# Patient Record
Sex: FEMALE | Race: WHITE | NOT HISPANIC OR LATINO | Employment: OTHER | URBAN - METROPOLITAN AREA
[De-identification: names, ages, dates, MRNs, and addresses within clinical notes are randomized per-mention and may not be internally consistent; named-entity substitution may affect disease eponyms.]

---

## 2020-09-22 ENCOUNTER — OFFICE VISIT (OUTPATIENT)
Dept: FAMILY MEDICINE CLINIC | Facility: CLINIC | Age: 70
End: 2020-09-22
Payer: MEDICARE

## 2020-09-22 ENCOUNTER — HOSPITAL ENCOUNTER (OUTPATIENT)
Dept: RADIOLOGY | Facility: HOSPITAL | Age: 70
Discharge: HOME | End: 2020-09-22
Payer: MEDICARE

## 2020-09-22 VITALS
SYSTOLIC BLOOD PRESSURE: 118 MMHG | DIASTOLIC BLOOD PRESSURE: 82 MMHG | HEART RATE: 92 BPM | WEIGHT: 186 LBS | RESPIRATION RATE: 16 BRPM | TEMPERATURE: 96.8 F | BODY MASS INDEX: 31.76 KG/M2 | OXYGEN SATURATION: 97 % | HEIGHT: 64 IN

## 2020-09-22 DIAGNOSIS — G56.91 NEUROPATHY, ARM, RIGHT: ICD-10-CM

## 2020-09-22 DIAGNOSIS — Z23 FLU VACCINE NEED: Primary | ICD-10-CM

## 2020-09-22 DIAGNOSIS — E78.2 MIXED HYPERLIPIDEMIA: ICD-10-CM

## 2020-09-22 DIAGNOSIS — E55.9 VITAMIN D DEFICIENCY: ICD-10-CM

## 2020-09-22 DIAGNOSIS — R73.01 ELEVATED FASTING GLUCOSE: ICD-10-CM

## 2020-09-22 DIAGNOSIS — G62.9 NEUROPATHY: ICD-10-CM

## 2020-09-22 PROBLEM — Z87.891 EX-SMOKER: Status: ACTIVE | Noted: 2020-06-25

## 2020-09-22 PROBLEM — R94.31 ABNORMAL EKG: Status: ACTIVE | Noted: 2020-06-25

## 2020-09-22 PROBLEM — I49.3 FREQUENT PVCS: Status: ACTIVE | Noted: 2020-06-25

## 2020-09-22 LAB — SL AMB POCT HEMOGLOBIN AIC: 5.9 (ref ?–6.5)

## 2020-09-22 PROCEDURE — 90662 IIV NO PRSV INCREASED AG IM: CPT

## 2020-09-22 PROCEDURE — 72072 X-RAY EXAM THORAC SPINE 3VWS: CPT

## 2020-09-22 PROCEDURE — 83036 HEMOGLOBIN GLYCOSYLATED A1C: CPT

## 2020-09-22 PROCEDURE — 99213 OFFICE O/P EST LOW 20 MIN: CPT

## 2020-09-22 PROCEDURE — 72050 X-RAY EXAM NECK SPINE 4/5VWS: CPT

## 2020-09-22 PROCEDURE — G0008 ADMIN INFLUENZA VIRUS VAC: HCPCS

## 2020-09-22 PROCEDURE — 72110 X-RAY EXAM L-2 SPINE 4/>VWS: CPT

## 2020-09-22 RX ORDER — TRIAMCINOLONE ACETONIDE 1 MG/G
CREAM TOPICAL
COMMUNITY
Start: 2020-07-13

## 2020-09-22 RX ORDER — PRAVASTATIN SODIUM 10 MG
TABLET ORAL
COMMUNITY
Start: 2020-07-13 | End: 2020-10-27 | Stop reason: SDUPTHER

## 2020-09-22 NOTE — PROGRESS NOTES
BMI Counseling: Body mass index is 31 93 kg/m²  The BMI is above normal  Nutrition recommendations include decreasing portion sizes, encouraging healthy choices of fruits and vegetables, decreasing fast food intake, consuming healthier snacks, limiting drinks that contain sugar, moderation in carbohydrate intake, increasing intake of lean protein, reducing intake of saturated and trans fat and reducing intake of cholesterol  Exercise recommendations include exercising 3-5 times per week  Patient referred to PCP due to patient being overweight  Falls Plan of Care: Medications that increase falls were reviewed  Assessed feet and footwear  Vitamin D supplementation was recommended  Home safety evaluation by OT recommended  Home safety education provided   PT OT orders in place    Assessment/Plan:    Presents in office for follow up and to re-establish care   History of abnormal EKGs and Frequent PVCs - cardiac work up with Dr Izabela Abel has been unremarkable   Needs follow up labs   Has been complaining of neuropathy type of numbness and tingling to both right hand and left lower extremity  I ordered x rays to get done   Will discuss   Recommended follow up with Therapy to get evaluated once x ray reviewed and if no major findings plan will be to follow up with NEUROLOGY for nerve testing   Tested for diabetes - HbA1C 5 9 prediabetic   Discussed diet and adjustment of lifestyle   Will order x rays of the spine and discuss further    Last labs   Component Value Date   CHOL 196 06/19/2020   HDL 44 (L) 06/19/2020   LDLCALC 107 (H) 06/19/2020   TRIG 337 (H) 06/19/2020     Hyperlipidemia - on Pravachol will need repeat labs   GERD uses PRN OTC meds   Refuses GI follow up for colonoscopy   Refuses breast mammogram   Refuses Pap   Will get Flu shot today - has not been getting them in the past      Problem List Items Addressed This Visit        Other    Mixed hyperlipidemia    Relevant Medications    pravastatin (PRAVACHOL) 10 mg tablet    Other Relevant Orders    Comprehensive metabolic panel    TSH, 3rd generation    Lipid panel    UA (URINE) with reflex to Scope    CBC and differential    Vitamin D 25 hydroxy      Other Visit Diagnoses     Flu vaccine need    -  Primary    Relevant Orders    influenza vaccine, high-dose, PF 0 7 mL (FLUZONE HIGH-DOSE) (Completed)    Neuropathy, arm, right        Relevant Orders    XR spine cervical complete 4 or 5 vw non injury    XR spine thoracic 3 vw    Comprehensive metabolic panel    TSH, 3rd generation    Lipid panel    UA (URINE) with reflex to Scope    CBC and differential    Vitamin D 25 hydroxy    Ambulatory referral to Physical Therapy    POCT hemoglobin A1c (Completed)    Neuropathy        Relevant Orders    XR spine lumbar minimum 4 views non injury    Comprehensive metabolic panel    TSH, 3rd generation    Lipid panel    UA (URINE) with reflex to Scope    CBC and differential    Vitamin D 25 hydroxy    Ambulatory referral to Physical Therapy    POCT hemoglobin A1c (Completed)    Vitamin D deficiency        Relevant Orders    Comprehensive metabolic panel    TSH, 3rd generation    Lipid panel    UA (URINE) with reflex to Scope    CBC and differential    Vitamin D 25 hydroxy    Elevated fasting glucose        Relevant Orders    POCT hemoglobin A1c (Completed)            Subjective:      Patient ID: Ramon Olivarez is a 79 y o  female  Presents in office for follow up and to re-establish care   History of abnormal EKGs and Frequent PVCs - cardiac work up with Dr Wright Holstein has been unremarkable   Needs follow up labs   Has been complaining of neuropathy type of numbness and tingling to both right hand and left lower extremity        The following portions of the patient's history were reviewed and updated as appropriate:   She has a past medical history of History of echocardiogram (04/06/2018)  ,  does not have any pertinent problems on file  ,   has a past surgical history that includes Sinus surgery; Bunionectomy; and Foot surgery (Bilateral)  ,  family history includes Heart attack in her father; Heart disease in her father; Valvular heart disease in her father  ,   reports that she has quit smoking  Her smoking use included cigarettes  She has a 5 00 pack-year smoking history  She has never used smokeless tobacco  She reports current alcohol use  No history on file for drug ,  has No Known Allergies     Current Outpatient Medications   Medication Sig Dispense Refill    pravastatin (PRAVACHOL) 10 mg tablet TAKE 1 TABLET BY MOUTH ONCE DAILY MONDAY THRU FRIDAY AND 2 TABS ON SATURDAY AND SUNDAY      TiZANidine (Zanaflex) 4 MG capsule Take 1 capsule (4 mg total) by mouth daily at bedtime as needed for muscle spasms for up to 14 days 14 capsule 0    triamcinolone (KENALOG) 0 1 % cream APPLY CREAM EXTERNALLY TWICE DAILY TO ECZEMA UP TO 2 WEEKS MONTH AS NEEDED       No current facility-administered medications for this visit  Review of Systems   Constitutional: Positive for fatigue  Negative for chills and fever  HENT: Negative for congestion, sinus pressure and sinus pain  Eyes: Negative  Respiratory: Negative for cough and shortness of breath  Cardiovascular: Negative for chest pain, palpitations and leg swelling  History of abnormal EKG and PVCs under care of Dr Castaneda Dense   cardiac work up done without any major findings    Gastrointestinal: Negative for abdominal distention and abdominal pain  Endocrine:        Neuropathy    Genitourinary: Negative for difficulty urinating and flank pain  Musculoskeletal: Positive for arthralgias and myalgias  Numbness and tingling to right hand and arm   And Left lower extremities    Neurological: Negative for dizziness, weakness and headaches  Hematological: Negative for adenopathy  Psychiatric/Behavioral: Negative for sleep disturbance and suicidal ideas  The patient is nervous/anxious            Objective:  Vitals:    09/22/20 1355 BP: 118/82   BP Location: Left arm   Patient Position: Sitting   Cuff Size: Standard   Pulse: 92   Resp: 16   Temp: (!) 96 8 °F (36 °C)   TempSrc: Tympanic   SpO2: 97%   Weight: 84 4 kg (186 lb)   Height: 5' 4" (1 626 m)     Body mass index is 31 93 kg/m²  Physical Exam  Vitals signs and nursing note reviewed  Constitutional:       Appearance: Normal appearance  HENT:      Head: Atraumatic  Eyes:      Extraocular Movements: Extraocular movements intact  Neck:      Musculoskeletal: Normal range of motion and neck supple  Cardiovascular:      Rate and Rhythm: Normal rate and regular rhythm  Pulses: Normal pulses  Heart sounds: Normal heart sounds  Comments: With history of PVCs   Pulmonary:      Effort: Pulmonary effort is normal       Breath sounds: Normal breath sounds  Abdominal:      Palpations: Abdomen is soft  Musculoskeletal: Normal range of motion  Comments: Numbness and tingling noted to right arm and left lower extremity    Skin:     General: Skin is warm  Neurological:      General: No focal deficit present  Mental Status: She is alert and oriented to person, place, and time     Psychiatric:         Behavior: Behavior normal

## 2020-09-22 NOTE — PATIENT INSTRUCTIONS
Peripheral Neuropathy   WHAT YOU NEED TO KNOW:   Peripheral neuropathy is a condition that affects how your nerves work  Nerves carry information from your brain to your body  The information does not transfer along your nerves correctly when you have neuropathy  When you have peripheral neuropathy, the nerves in your legs, arms, feet, or hands are affected  It also may affect your organs, such as your lungs, stomach, bladder, or genitals  This condition may go away on its own or you may always have it  DISCHARGE INSTRUCTIONS:   Medicines:   · Pain medicine: You may be given medicine to take away or decrease pain  Do not wait until the pain is severe before you take your medicine  · Antidepressants: This medicine helps to decrease or stop the symptoms of depression  It also used to help decrease pain  Take this medicine as directed  · Antiseizure medicine: This medicine is usually given to control seizures, but it also helps with nerve pain  · Take your medicine as directed  Contact your healthcare provider if you think your medicine is not helping or if you have side effects  Tell him of her if you are allergic to any medicine  Keep a list of the medicines, vitamins, and herbs you take  Include the amounts, and when and why you take them  Bring the list or the pill bottles to follow-up visits  Carry your medicine list with you in case of an emergency  Follow up with your healthcare provider or neurologist as directed:  Write down your questions so you remember to ask them during your visits  Physical therapy:  Physical and occupational therapists may help you exercise your arms, legs, and hands  They may teach you new ways to do things at home  Brace or splint:  You may need a device that supports or holds a body part still  For example, if you have carpal tunnel syndrome, you may need to wear a wrist brace  Manage your peripheral neuropathy:   · Avoid falls: Move with care and stand up slowly  Wear shoes that support your feet, and do not go barefoot  Ask about walking aids, such as a cane or walker  You may want to install railings or nonslip pads in your home, especially in the bathroom  Ask for more information on how to avoid falls  · Check your skin daily:  Sores can form where your skin makes contact with chairs, beds, or other body parts  They also can form under splints  Keep your skin clean, and check your skin daily for sores  · Exercise:  Ask about the best exercise plan for you  Physical activity may increase your balance and strength and may decrease your pain  It is best to start exercising slowly and do more as you get stronger  Contact your healthcare provider or neurologist if:   · Your pain is severe  · You cannot control your bladder  · You have trouble having sex  · You have questions or concerns about your condition or care  Return to the emergency department if:   · You fall  · You cannot walk at all  © 2017 [employer]  Information is for End User's use only and may not be sold, redistributed or otherwise used for commercial purposes  All illustrations and images included in CareNotes® are the copyrighted property of [employer] , [employer]  or [employer]anie[employer]  The above information is an  only  It is not intended as medical advice for individual conditions or treatments  Talk to your doctor, nurse or pharmacist before following any medical regimen to see if it is safe and effective for you

## 2020-09-25 ENCOUNTER — TELEPHONE (OUTPATIENT)
Dept: FAMILY MEDICINE CLINIC | Facility: CLINIC | Age: 70
End: 2020-09-25

## 2020-09-25 DIAGNOSIS — G62.9 NEUROPATHY: Primary | ICD-10-CM

## 2020-09-25 RX ORDER — TIZANIDINE HYDROCHLORIDE 4 MG/1
4 CAPSULE, GELATIN COATED ORAL
Qty: 14 CAPSULE | Refills: 0 | Status: SHIPPED | OUTPATIENT
Start: 2020-09-25 | End: 2021-07-20 | Stop reason: CLARIF

## 2020-10-27 ENCOUNTER — TELEPHONE (OUTPATIENT)
Dept: FAMILY MEDICINE CLINIC | Facility: CLINIC | Age: 70
End: 2020-10-27

## 2020-10-27 DIAGNOSIS — E78.2 MIXED HYPERLIPIDEMIA: Primary | ICD-10-CM

## 2020-10-27 RX ORDER — PRAVASTATIN SODIUM 10 MG
TABLET ORAL
Qty: 120 TABLET | Refills: 1 | Status: SHIPPED | OUTPATIENT
Start: 2020-10-27 | End: 2021-05-03 | Stop reason: SDUPTHER

## 2021-01-25 ENCOUNTER — TELEPHONE (OUTPATIENT)
Dept: NEUROLOGY | Facility: CLINIC | Age: 71
End: 2021-01-25

## 2021-01-26 ENCOUNTER — TELEPHONE (OUTPATIENT)
Dept: NEUROLOGY | Facility: CLINIC | Age: 71
End: 2021-01-26

## 2021-01-26 ENCOUNTER — CONSULT (OUTPATIENT)
Dept: NEUROLOGY | Facility: CLINIC | Age: 71
End: 2021-01-26
Payer: MEDICARE

## 2021-01-26 VITALS
BODY MASS INDEX: 31.45 KG/M2 | SYSTOLIC BLOOD PRESSURE: 134 MMHG | DIASTOLIC BLOOD PRESSURE: 80 MMHG | WEIGHT: 183.2 LBS | HEART RATE: 104 BPM

## 2021-01-26 DIAGNOSIS — R20.3 HYPERESTHESIA: ICD-10-CM

## 2021-01-26 DIAGNOSIS — G56.91 NEUROPATHY, ARM, RIGHT: ICD-10-CM

## 2021-01-26 DIAGNOSIS — R73.09 OTHER ABNORMAL GLUCOSE: ICD-10-CM

## 2021-01-26 DIAGNOSIS — R20.2 PARESTHESIA: ICD-10-CM

## 2021-01-26 DIAGNOSIS — G62.9 NEUROPATHY: Primary | ICD-10-CM

## 2021-01-26 PROCEDURE — 99204 OFFICE O/P NEW MOD 45 MIN: CPT | Performed by: PSYCHIATRY & NEUROLOGY

## 2021-01-26 NOTE — ASSESSMENT & PLAN NOTE
Hyperesthesia of the LLE present since September 2020  Initially started over a couple of days as a tingling of the LLE and progressively became a burning hypersensitivity from the mid abdomen down  Denies trauma, medical changes, or other notable issues prior to onset  She denies bowel or bladder changes  Exam is positive for sensory changes of the LLE to T6-T7  No previous labs or MRIs    X-rays whole spine positive for degenerative changes    Impression: rapid onset unilateral hyperesthesia of the LLE concerning for spinal cord involvement       Plan:  - MRI C/T/L spine + brain w/wo contrast recommended although pt requires sedation and will not attend 2 different sedation events, as such will prioritize C/T spine at this time  - neuropathy labs ordered  - follow up in 3 months

## 2021-01-26 NOTE — ASSESSMENT & PLAN NOTE
Chaka Gomez is a 79 y o  female who presents for complaint of numbness of the distal right upper extremity of the first 3 digits  This has been present for 10 years, has not changed in character or location since onset  Consistent with likely carpal tunnel syndrome  Was never previously assessed and did not have imaging or EMG previously      Plan:  - see plan under hyperesthesia

## 2021-01-26 NOTE — TELEPHONE ENCOUNTER
Patient denied having AVS printed  She was upset that she could not do all 4 MRI's under Anesthesia at once  We tried to explain to her that that is not possible  We offered first focus only on the cervical and thoracic spine and have those scheduled under Anesthesia  Patient got upset again and only wanted to go to [facility] to have MRI's done and refused to got to [facility] or [place]  She walked out on me  I mailed her AVS and scripts to her

## 2021-01-26 NOTE — PROGRESS NOTES
Patient ID: Suhail Muse is a 79 y o  female  Assessment/Plan:    Neuropathy  Suhail Muse is a 79 y o  female who presents for complaint of numbness of the distal right upper extremity of the first 3 digits  This has been present for 10 years, has not changed in character or location since onset  Consistent with likely carpal tunnel syndrome  Was never previously assessed and did not have imaging or EMG previously  Plan:  - see plan under hyperesthesia    Hyperesthesia  Hyperesthesia of the LLE present since September 2020  Initially started over a couple of days as a tingling of the LLE and progressively became a burning hypersensitivity from the mid abdomen down  Denies trauma, medical changes, or other notable issues prior to onset  She denies bowel or bladder changes  Exam is positive for sensory changes of the LLE to T6-T7  No previous labs or MRIs    X-rays whole spine positive for degenerative changes    Impression: rapid onset unilateral hyperesthesia of the LLE concerning for spinal cord involvement  Plan:  - MRI C/T/L spine + brain w/wo contrast recommended although pt requires sedation and will not attend 2 different sedation events, as such will prioritize C/T spine at this time  - neuropathy labs ordered  - follow up in 3 months       Diagnoses and all orders for this visit:    Neuropathy  -     Ambulatory referral to Neurology  -     MRI brain with and without contrast; Future  -     MRI lumbar spine w wo contrast; Future  -     MRI thoracic spine with and without contrast; Future  -     MRI cervical spine with and without contrast; Future  -     Vitamin B12; Future  -     Protein electrophoresis, serum; Future  -     Protein electrophoresis, urine  -     RPR; Future  -     Hemoglobin A1C; Future  -     TSH, 3rd generation with Free T4 reflex; Future  -     LISA Screen w/ Reflex to Titer/Pattern; Future  -     Sedimentation rate, automated;  Future  -     Lyme Total Antibody Profile with reflex to WB; Future    Neuropathy, arm, right  -     Ambulatory referral to Neurology    Paresthesia  -     MRI brain with and without contrast; Future  -     MRI lumbar spine w wo contrast; Future  -     MRI thoracic spine with and without contrast; Future  -     MRI cervical spine with and without contrast; Future  -     Vitamin B12; Future  -     Protein electrophoresis, serum; Future  -     Protein electrophoresis, urine  -     RPR; Future  -     Hemoglobin A1C; Future  -     TSH, 3rd generation with Free T4 reflex; Future  -     LISA Screen w/ Reflex to Titer/Pattern; Future  -     Sedimentation rate, automated; Future  -     Lyme Total Antibody Profile with reflex to WB; Future    Hyperesthesia  -     MRI brain with and without contrast; Future  -     MRI lumbar spine w wo contrast; Future  -     MRI thoracic spine with and without contrast; Future  -     MRI cervical spine with and without contrast; Future  -     Vitamin B12; Future  -     Protein electrophoresis, serum; Future  -     Protein electrophoresis, urine  -     RPR; Future  -     Hemoglobin A1C; Future  -     TSH, 3rd generation with Free T4 reflex; Future  -     LISA Screen w/ Reflex to Titer/Pattern; Future  -     Sedimentation rate, automated; Future  -     Lyme Total Antibody Profile with reflex to WB; Future    Other abnormal glucose   -     Hemoglobin A1C; Future           Subjective:    HPI    Erie Galeazzi is a 79 y o  female who presents to clinic as a new assessment for neuropathy and complaint of burning pain in her LLE which started recently  10 years ago, she started having numbness and tingling first 3 digits of the right hand  Never got any worse or better and she couldn't afford to see anyone about it, so she let it go  In September of 2020 she started experiencing tingling of the left leg/foot, she obtained xray of the full spine which was negative  She was offered PT and muscle relaxer and did not follow up with either   She reports the feeling in her left leg from the mid torso with a midline and the feeling is reported as a burning feeling to the touch  The issue started as a tingling feeling in September and has become more hypersensitive to touch since then with no apparent progression since onset  Her medical history is significant for HLD (on pravastatin, has high TG) and GERD, otherwise unremarkable  She describes symptoms of numbness, burning, tingling, hypersensitivity and allodynia affecting her extremities which is left lower extremity and distal right upper extremity, of moderate severity  She denies squeezing  Onset of symptoms was sudden, not related to any specific activity  Symptoms currently occur all day and last all day  She denies lancinating pain  Previous treatment has included ibuprofen, which has not improved symptoms  Prior evaluation has included none  The following portions of the patient's history were reviewed and updated as appropriate: allergies, current medications, past family history, past medical history, past social history, past surgical history and problem list          Objective:    Blood pressure 134/80, pulse 104, weight 83 1 kg (183 lb 3 2 oz)  Physical Exam  Vitals signs and nursing note reviewed  Constitutional:       General: She is not in acute distress  Appearance: She is well-developed  Eyes:      Extraocular Movements: Extraocular movements intact  Pupils: Pupils are equal, round, and reactive to light  Neck:      Musculoskeletal: Normal range of motion and neck supple  Cardiovascular:      Rate and Rhythm: Normal rate and regular rhythm  Heart sounds: Normal heart sounds  Pulmonary:      Effort: Pulmonary effort is normal       Breath sounds: Normal breath sounds  Musculoskeletal: Normal range of motion     Neurological:      Deep Tendon Reflexes: Strength normal       Reflex Scores:       Tricep reflexes are 3+ on the right side and 3+ on the left side  Bicep reflexes are 2+ on the right side and 2+ on the left side  Brachioradialis reflexes are 3+ on the right side and 3+ on the left side  Patellar reflexes are 3+ on the right side and 3+ on the left side  Achilles reflexes are 3+ on the right side and 3+ on the left side  Psychiatric:         Speech: Speech normal          Neurological Exam  Mental Status  Awake, alert and oriented to person, place and time  Recent and remote memory are intact  Speech is normal  Language is fluent with no aphasia  Attention and concentration are normal  Fund of knowledge is appropriate for level of education  Cranial Nerves  CN III, IV, VI: Extraocular movements intact bilaterally  Pupils equal round and reactive to light bilaterally  CN V: Facial sensation is normal   CN VII: Full and symmetric facial movement  CN VIII: Hearing is normal   CN IX, X: Palate elevates symmetrically  Normal gag reflex  CN XI: Shoulder shrug strength is normal   CN XII: Tongue midline without atrophy or fasciculations  Motor  Normal muscle bulk throughout  Normal muscle tone  Strength is 5/5 throughout all four extremities  Sensory  Light touch abnormality: Decreased to light touch in the right hand first 3 digits  Decreased to light touch in the LLE throughout  Pinprick abnormality: Decreased in LLE throughout  Decreased from distal midfoot of the RLE  Hyperesthesia of the left side of the back at about T6-T7 and distal  Temperature abnormality: Hyperesthesia of the LLE throughout to T6-T7  Diminished to temperature to ankle of the RLE  Decreased to temperature below the knee of the LLE  Vibration abnormality: Decreased of RUE first 3 digits       Reflexes                                           Right                      Left  Brachioradialis                    3+                         3+  Biceps                                 2+                         2+  Triceps 3+                         3+  Patellar                                3+                         3+  Achilles                                3+                         3+  Plantar                           Mute                Mute    Right pathological reflexes: Maureen's present  Left pathological reflexes: Maureen's absent  Coordination  Right: Finger-to-nose normal  Rapid alternating movement normal   Left: Finger-to-nose normal  Rapid alternating movement normal     Gait  Casual gait is normal including stance, stride, and arm swing  ROS:    Review of systems as documented by the MA was reviewed in full by myself, Zia Vásquez MD        Review of Systems   Constitutional: Negative  Negative for appetite change and fever  HENT: Negative  Negative for hearing loss, tinnitus, trouble swallowing and voice change  Eyes: Negative  Negative for photophobia and pain  Respiratory: Negative  Negative for shortness of breath  Cardiovascular: Negative  Negative for palpitations  Gastrointestinal: Negative  Negative for nausea and vomiting  Endocrine: Negative  Negative for cold intolerance  Genitourinary: Negative  Negative for dysuria, frequency and urgency  Musculoskeletal: Negative  Negative for myalgias and neck pain  Skin: Negative  Negative for rash  Warm feeling of the skin, but when touching it has a burning sensation of the skin   Neurological: Positive for numbness (L leg)  Negative for dizziness, tremors, seizures, syncope, facial asymmetry, speech difficulty, weakness, light-headedness and headaches  Tingling - R hand - fingers   Hematological: Negative  Does not bruise/bleed easily  Psychiatric/Behavioral: Negative  Negative for confusion, hallucinations and sleep disturbance

## 2021-02-13 DIAGNOSIS — Z23 ENCOUNTER FOR IMMUNIZATION: ICD-10-CM

## 2021-02-17 ENCOUNTER — IMMUNIZATIONS (OUTPATIENT)
Dept: FAMILY MEDICINE CLINIC | Facility: HOSPITAL | Age: 71
End: 2021-02-17

## 2021-02-17 DIAGNOSIS — Z23 ENCOUNTER FOR IMMUNIZATION: Primary | ICD-10-CM

## 2021-02-17 PROCEDURE — 0001A SARS-COV-2 / COVID-19 MRNA VACCINE (PFIZER-BIONTECH) 30 MCG: CPT

## 2021-02-17 PROCEDURE — 91300 SARS-COV-2 / COVID-19 MRNA VACCINE (PFIZER-BIONTECH) 30 MCG: CPT

## 2021-03-08 ENCOUNTER — IMMUNIZATIONS (OUTPATIENT)
Dept: FAMILY MEDICINE CLINIC | Facility: HOSPITAL | Age: 71
End: 2021-03-08

## 2021-03-08 DIAGNOSIS — Z23 ENCOUNTER FOR IMMUNIZATION: Primary | ICD-10-CM

## 2021-03-08 PROCEDURE — 0002A SARS-COV-2 / COVID-19 MRNA VACCINE (PFIZER-BIONTECH) 30 MCG: CPT

## 2021-03-08 PROCEDURE — 91300 SARS-COV-2 / COVID-19 MRNA VACCINE (PFIZER-BIONTECH) 30 MCG: CPT

## 2021-05-03 DIAGNOSIS — E78.2 MIXED HYPERLIPIDEMIA: ICD-10-CM

## 2021-05-03 RX ORDER — PRAVASTATIN SODIUM 10 MG
TABLET ORAL
Qty: 120 TABLET | Refills: 1 | Status: SHIPPED | OUTPATIENT
Start: 2021-05-03 | End: 2021-08-06

## 2021-07-12 ENCOUNTER — RA CDI HCC (OUTPATIENT)
Dept: OTHER | Facility: HOSPITAL | Age: 71
End: 2021-07-12

## 2021-07-12 NOTE — PROGRESS NOTES
[facility]          Chart reviewed, no opportunity found: CHART REVIEWED, NO OPPORTUNITY FOUND                     Patients insurance company:  [email or web address] [facility] (Medicare Advantage and Commercial)

## 2021-07-20 ENCOUNTER — OFFICE VISIT (OUTPATIENT)
Dept: FAMILY MEDICINE CLINIC | Facility: CLINIC | Age: 71
End: 2021-07-20
Payer: MEDICARE

## 2021-07-20 VITALS
OXYGEN SATURATION: 98 % | SYSTOLIC BLOOD PRESSURE: 122 MMHG | HEIGHT: 64 IN | HEART RATE: 75 BPM | DIASTOLIC BLOOD PRESSURE: 72 MMHG | RESPIRATION RATE: 18 BRPM | BODY MASS INDEX: 30.56 KG/M2 | TEMPERATURE: 98.2 F | WEIGHT: 179 LBS

## 2021-07-20 DIAGNOSIS — Z11.59 NEED FOR HEPATITIS C SCREENING TEST: ICD-10-CM

## 2021-07-20 DIAGNOSIS — G62.9 NEUROPATHY: ICD-10-CM

## 2021-07-20 DIAGNOSIS — E55.9 VITAMIN D INSUFFICIENCY: ICD-10-CM

## 2021-07-20 DIAGNOSIS — R73.9 ELEVATED BLOOD SUGAR: ICD-10-CM

## 2021-07-20 DIAGNOSIS — E78.2 MIXED HYPERLIPIDEMIA: Primary | ICD-10-CM

## 2021-07-20 DIAGNOSIS — G60.3 IDIOPATHIC PROGRESSIVE NEUROPATHY: ICD-10-CM

## 2021-07-20 DIAGNOSIS — R73.03 PREDIABETES: ICD-10-CM

## 2021-07-20 PROCEDURE — 99215 OFFICE O/P EST HI 40 MIN: CPT | Performed by: FAMILY MEDICINE

## 2021-07-20 NOTE — PROGRESS NOTES
BMI Counseling: Body mass index is 30 73 kg/m²  The BMI is above normal  Nutrition recommendations include decreasing portion sizes, encouraging healthy choices of fruits and vegetables, decreasing fast food intake, consuming healthier snacks, limiting drinks that contain sugar, moderation in carbohydrate intake, increasing intake of lean protein, reducing intake of saturated and trans fat and reducing intake of cholesterol  Exercise recommendations include moderate physical activity 150 minutes/week and exercising 3-5 times per week  No pharmacotherapy was ordered  Assessment/Plan:  80 yo female, not seen by me in 10+ yrs  Since that time -- she doesn't go to doctors much -- had both cataracts off (Dr Pascale Short and then [facility]) but pre-op at [facility] showed heart beat irreg and saw Dr Edna Hernandez and all tests, incl stress test and ECHO - all normal   All this done since 2018  Having no problems currently  F/u and eval HLD:  Dr Edna Hernandez put on Pravastatin 10 mg 2 OD    F/u and eval GERD: on Omeprazol 20 (?) q a m  Long talk re PPI and H2 blocker  I have advised to get off the omeprazole and take Pepcid 40 mg once daily and have given her literature regarding that    Smoking cessation -- costs $2100 the last visit with the labs==>but did end the smoking completely  This was 11 yrs ago  Anxiety - over claustrophia-- refusing the MRI 's of Dr Alka Guerrero, as no changes  Very long talk discussing all of the studies both laboratory x-ray and MRIs ordered by Dr Alka Guerrero to determine whether there is any pathology in the cervical and thoracic spine  Patient is concerned about claustrophobia and does not want to have these tests done as her symptoms have for the most part abated  They may be there but have not worsened in the past 8 or 9 months    Basically still has some sort of radiculopathy in the right upper extremity possibly carpal tunnel and a hyperesthesia of the left lower extremity with alteration of cold and heat sensation  Neurologist seem to feel this was coming from the T6-7 vertebral range and wanted MRIs of the entire spine and brain--looking for even symptoms of MS question    PPI usage x many yrs - and long discussions there  Needs to get labs  Review all X-rays of 9 22 and d/w           Problem List Items Addressed This Visit        Nervous and Auditory    Neuropathy    Relevant Orders    CBC    UA w Reflex to Microscopic w Reflex to Culture    Comprehensive metabolic panel    Lipid panel    TSH, 3rd generation    Vitamin D 25 hydroxy    Protein electrophoresis, serum    Protein electrophoresis, urine    Vitamin B12    C-reactive protein       Other    Mixed hyperlipidemia - Primary    Relevant Orders    Comprehensive metabolic panel    Lipid panel      Other Visit Diagnoses     Vitamin D insufficiency        Relevant Orders    Vitamin D 25 hydroxy    Elevated blood sugar        Relevant Orders    Hemoglobin A1C    Prediabetes         Relevant Orders    TSH, 3rd generation    Idiopathic progressive neuropathy         Relevant Orders    Vitamin B12    Need for hepatitis C screening test        Relevant Orders    Hepatitis C antibody            Subjective:  75-year-old white female in no acute distress, pleasant and anxious to have me review her entire situation as it presents  Discussed all the test ordered which have not been done yet and have expressed my feelings that they should be  Patient ID: Amy Armstrong is a 79 y o  female  HPI-- 78 yo female, not seen by me in 10+ yrs  Since that time -- she doesn't go to doctors much -- had both cataracts off (Dr Melina Mohs and then [facility]) but pre[facility]op at [facility] showed hert beat irreg and saw Dr Danielle Murray and all tests, incl stress test and ECHO - all normal   All this done since 2018  F/u and eval HLD:  Dr Danielle Murray put on Pravastatin 10 mg 2 OD    F/u and eval GERD: on Omeprazol 20 (?) q a m    Long talk re PPI and H2 blocker  Smoking cessation -- costs $2100 the last visit with the labs==>but did end the smoking completely  This was 11 yrs ago  Anxiety - over claustrophia-- refusing the MRI 's of Dr Jem Escobedo, as no changes    PPI usage x many yrs - and long discussions there  Needs to get labs  Review all X-rays of 9 22 and d/w    The following portions of the patient's history were reviewed and updated as appropriate:   Past Medical History:  She has a past medical history of History of echocardiogram (04/06/2018)  ,  _______________________________________________________________________  Medical Problems:  does not have any pertinent problems on file ,  _______________________________________________________________________  Past Surgical History:   has a past surgical history that includes Sinus surgery; Bunionectomy; and Foot surgery (Bilateral)  ,  _______________________________________________________________________  Family History:  family history includes Heart attack in her father; Heart disease in her father; Valvular heart disease in her father ,  _______________________________________________________________________  Social History:   reports that she has quit smoking  Her smoking use included cigarettes  She has a 5 00 pack-year smoking history  She has never used smokeless tobacco  She reports current alcohol use  No history on file for drug use ,  _______________________________________________________________________  Allergies:  has No Known Allergies     _______________________________________________________________________  Current Outpatient Medications   Medication Sig Dispense Refill    pravastatin (PRAVACHOL) 10 mg tablet Take one tablet daily Monday through Friday 2 tablets saturday and Sunday 120 tablet 1    triamcinolone (KENALOG) 0 1 % cream APPLY CREAM EXTERNALLY TWICE DAILY TO ECZEMA UP TO 2 WEEKS MONTH AS NEEDED       No current facility-administered medications for this visit  _______________________________________________________________________  Review of Systems   Constitutional: Positive for fatigue  Negative for chills and fever  HENT: Negative for congestion, sinus pressure and sinus pain  Eyes: Negative  Respiratory: Negative for cough and shortness of breath  Cardiovascular: Negative for chest pain, palpitations and leg swelling  History of abnormal EKG and PVCs under care of Dr Jihan Lynn   cardiac work up done without any major findings    Gastrointestinal: Negative for abdominal distention and abdominal pain  Endocrine:        Neuropathy    Genitourinary: Negative for difficulty urinating and flank pain  Musculoskeletal: Positive for arthralgias and myalgias  Numbness and tingling to right hand and arm   And Left lower extremities    Neurological: Negative for dizziness, weakness and headaches  Hematological: Negative for adenopathy  Psychiatric/Behavioral: Negative for sleep disturbance and suicidal ideas  The patient is nervous/anxious  Objective:  Vitals:    07/20/21 1504   BP: 122/72   Pulse: 75   Resp: 18   Temp: 98 2 °F (36 8 °C)   SpO2: 98%   Weight: 81 2 kg (179 lb)   Height: 5' 4" (1 626 m)     Body mass index is 30 73 kg/m²  Physical Exam  Vitals and nursing note reviewed  Constitutional:       Appearance: Normal appearance  HENT:      Head: Atraumatic  Eyes:      Extraocular Movements: Extraocular movements intact  Cardiovascular:      Rate and Rhythm: Normal rate and regular rhythm  Pulses: Normal pulses  Heart sounds: Normal heart sounds  Comments: With history of PVCs   Pulmonary:      Effort: Pulmonary effort is normal       Breath sounds: Normal breath sounds  Abdominal:      Palpations: Abdomen is soft  Musculoskeletal:         General: Normal range of motion  Cervical back: Normal range of motion and neck supple        Comments: Numbness and tingling noted to right arm and left lower extremity    Skin:     General: Skin is warm  Neurological:      General: No focal deficit present  Mental Status: She is alert and oriented to person, place, and time  Psychiatric:         Mood and Affect: Mood normal          Behavior: Behavior normal          Thought Content: Thought content normal          Judgment: Judgment normal          I urged mammo--she stated she would never have done  At that point I helped her to understand that the incidence of breast cancer at age 48 is 3 in 48 women and at age 80 in 8  I strongly urged her to proceed with cancer screenings ,etc   50-55 minutes spent with patient This time was spent reviewing previous records, reviewing previous laboratory and other tests, taking history from patient, examination of patient, discussion of prognosis and treatment, ordering laboratory tests, ordering medications, and completion of the medical record

## 2021-07-20 NOTE — PATIENT INSTRUCTIONS
nPeripheral Neuropathy   WHAT YOU NEED TO KNOW:   Peripheral neuropathy is a condition that affects nerves in your arms, legs, hands, or feet  It also may affect your organs, such as your lungs, stomach, bladder, or genitals  Peripheral neuropathy can affect the nerves that allow you to move or to feel objects  It also may affect nerves that control your body functions, such as digestion or urination  This condition may go away on its own, or you may always have it  DISCHARGE INSTRUCTIONS:   Call your local emergency number (911 in the [address] [place] [address],[address]) if:   · You cannot walk at all  Return to the emergency department if:   · You fall  Call your doctor or neurologist if:   · Your pain is severe  · You cannot control your bladder  · You have trouble having sex  · You have questions or concerns about your condition or care  Medicines: You may need any of the following:  · Prescription pain medicine  may be given  Ask your healthcare provider how to take this medicine safely  Some prescription pain medicines contain acetaminophen  Do not take other medicines that contain acetaminophen without talking to your healthcare provider  Too much acetaminophen may cause liver damage  Prescription pain medicine may cause constipation  Ask your healthcare provider how to prevent or treat constipation  · Antidepressants  also help decrease pain  · Antiseizure medicine  also helps with nerve pain  · Take your medicine as directed  Contact your healthcare provider if you think your medicine is not helping or if you have side effects  Tell him of her if you are allergic to any medicine  Keep a list of the medicines, vitamins, and herbs you take  Include the amounts, and when and why you take them  Bring the list or the pill bottles to follow-up visits  Carry your medicine list with you in case of an emergency  Manage peripheral neuropathy:   · Go to physical or occupational therapy as directed    Physical and occupational therapists may help you exercise your arms, legs, and hands  They may teach you new ways to do things at home  · Wear a brace or splint, if directed  You may need a device that supports or holds a body part still  For example, if you have carpal tunnel syndrome, you may need to wear a wrist brace  · Prevent falls  Move with care and stand up slowly  Wear shoes that support your feet, and do not go barefoot  Ask about walking aids, such as a cane or walker  You may want to install railings or nonslip pads in your home, especially in the bathroom  Ask for more information on how to avoid falls  · Check your skin daily  Sores can form where your skin makes contact with chairs, beds, or other body parts  They also can form under splints  Keep your skin clean, and check your skin daily for sores  · Exercise as directed  Ask about the best exercise plan for you  Physical activity may increase your balance and strength and may decrease your pain  It is best to start exercising slowly and do more as you get stronger  Follow up with your doctor or neurologist as directed:  Write down your questions so you remember to ask them during your visits  © Copyright [employer] 2021 Information is for End User's use only and may not be sold, redistributed or otherwise used for commercial purposes  All illustrations and images included in CareNotes® are the copyrighted property of [employer]  or [facility] Desiree Kenny   The above information is an  only  It is not intended as medical advice for individual conditions or treatments  Talk to your doctor, nurse or pharmacist before following any medical regimen to see if it is safe and effective for you  Carpal Tunnel Syndrome   WHAT YOU SHOULD KNOW:   Carpal tunnel syndrome (CTS) is a condition where there is increased pressure on the median nerve in the wrist  The median nerve controls muscles and feeling in the hand   Pressure may come from overuse and swelling of ligaments in the wrist    INSTRUCTIONS:   Medicines:   · NSAIDs:  These medicines decrease swelling and pain  NSAIDs are available without a doctor's order  Ask your primary healthcare provider which medicine is right for you and how much to take  Take as directed  NSAIDs can cause stomach bleeding or kidney problems if not taken correctly  · Take your medicine as directed  Call your healthcare provider if you think your medicine is not helping or if you have side effects  Tell him if you are allergic to any medicine  Keep a list of the medicines, vitamins, and herbs you take  Include the amounts, and when and why you take them  Bring the list or the pill bottles to follow-up visits  Carry your medicine list with you in case of an emergency  Follow up with your healthcare provider as directed:  Write down your questions so you remember to ask them during your visits  Manage your symptoms:   · Use ice:  Ice helps decrease swelling and pain in your wrist  Ice may also help prevent tissue damage  Use an ice pack or put crushed ice in a plastic bag  Cover the ice pack with a towel and place it on your wrist for 15 to 20 minutes every hour  · Get physical and occupational therapy:  Physical therapists will show you ways to exercise and strengthen your wrist  Occupational therapists will show you safe ways to use your wrist while you do your usual activities  · Rest your hands:  Let your hands rest for a short time between repetitive motions, such as typing  If you feel pain, stop what you are doing and gently massage your wrist and hand  · Use a wrist splint: This keeps your wrist straight or in a slightly bent position  A wrist splint decreases pressure on the median nerve by letting your wrist rest  You may need to wear the splint for up to 8 weeks  You may need to wear your wrist splint at night      · Evaluate your work habits:  Ask about ways to modify your work to help decrease your symptoms  Contact your primary healthcare provider if:   · Your symptoms are worse than before  · You have questions or concerns about your condition or care  Return to the emergency department if:   · You suddenly lose feeling and cannot move your hand  · Your hand suddenly changes color  © 2014 [address] is for End User's use only and may not be sold, redistributed or otherwise used for commercial purposes  All illustrations and images included in CareNotes® are the copyrighted property of A D A M , Inc  or Seferino Samaniego  The above information is an  only  It is not intended as medical advice for individual conditions or treatments  Talk to your doctor, nurse or pharmacist before following any medical regimen to see if it is safe and effective for you  Peripheral Neuropathy   WHAT YOU NEED TO KNOW:   Peripheral neuropathy is a condition that affects nerves in your arms, legs, hands, or feet  It also may affect your organs, such as your lungs, stomach, bladder, or genitals  Peripheral neuropathy can affect the nerves that allow you to move or to feel objects  It also may affect nerves that control your body functions, such as digestion or urination  This condition may go away on its own, or you may always have it  DISCHARGE INSTRUCTIONS:   Call your local emergency number (911 in the [address] [facility],[address]) if:   · You cannot walk at all  Return to the emergency department if:   · You fall  Call your doctor or neurologist if:   · Your pain is severe  · You cannot control your bladder  · You have trouble having sex  · You have questions or concerns about your condition or care  Medicines: You may need any of the following:  · Prescription pain medicine  may be given  Ask your healthcare provider how to take this medicine safely  Some prescription pain medicines contain acetaminophen   Do not take other medicines that contain acetaminophen without talking to your healthcare provider  Too much acetaminophen may cause liver damage  Prescription pain medicine may cause constipation  Ask your healthcare provider how to prevent or treat constipation  · Antidepressants  also help decrease pain  · Antiseizure medicine  also helps with nerve pain  · Take your medicine as directed  Contact your healthcare provider if you think your medicine is not helping or if you have side effects  Tell him of her if you are allergic to any medicine  Keep a list of the medicines, vitamins, and herbs you take  Include the amounts, and when and why you take them  Bring the list or the pill bottles to follow-up visits  Carry your medicine list with you in case of an emergency  Manage peripheral neuropathy:   · Go to physical or occupational therapy as directed  Physical and occupational therapists may help you exercise your arms, legs, and hands  They may teach you new ways to do things at home  · Wear a brace or splint, if directed  You may need a device that supports or holds a body part still  For example, if you have carpal tunnel syndrome, you may need to wear a wrist brace  · Prevent falls  Move with care and stand up slowly  Wear shoes that support your feet, and do not go barefoot  Ask about walking aids, such as a cane or walker  You may want to install railings or nonslip pads in your home, especially in the bathroom  Ask for more information on how to avoid falls  · Check your skin daily  Sores can form where your skin makes contact with chairs, beds, or other body parts  They also can form under splints  Keep your skin clean, and check your skin daily for sores  · Exercise as directed  Ask about the best exercise plan for you  Physical activity may increase your balance and strength and may decrease your pain  It is best to start exercising slowly and do more as you get stronger         Follow up with your doctor or neurologist as directed:  Write down your questions so you remember to ask them during your visits  © Copyright [employer] 2021 Information is for End User's use only and may not be sold, redistributed or otherwise used for commercial purposes  All illustrations and images included in CareNotes® are the copyrighted property of A D A M , Inc  or Orlando Kenny   The above information is an  only  It is not intended as medical advice for individual conditions or treatments  Talk to your doctor, nurse or pharmacist before following any medical regimen to see if it is safe and effective for you

## 2021-07-29 ENCOUNTER — APPOINTMENT (OUTPATIENT)
Dept: LAB | Facility: AMBULARY SURGERY CENTER | Age: 71
End: 2021-07-29
Payer: MEDICARE

## 2021-07-29 DIAGNOSIS — E78.2 MIXED HYPERLIPIDEMIA: ICD-10-CM

## 2021-07-29 DIAGNOSIS — E55.9 VITAMIN D INSUFFICIENCY: ICD-10-CM

## 2021-07-29 DIAGNOSIS — Z11.59 NEED FOR HEPATITIS C SCREENING TEST: ICD-10-CM

## 2021-07-29 DIAGNOSIS — R73.9 ELEVATED BLOOD SUGAR: ICD-10-CM

## 2021-07-29 DIAGNOSIS — G60.3 IDIOPATHIC PROGRESSIVE NEUROPATHY: ICD-10-CM

## 2021-07-29 DIAGNOSIS — R73.03 PREDIABETES: ICD-10-CM

## 2021-07-29 DIAGNOSIS — G62.9 NEUROPATHY: ICD-10-CM

## 2021-07-29 LAB
25(OH)D3 SERPL-MCNC: 25.6 NG/ML (ref 30–100)
ALBUMIN SERPL BCP-MCNC: 3.5 G/DL (ref 3.5–5)
ALP SERPL-CCNC: 75 U/L (ref 46–116)
ALT SERPL W P-5'-P-CCNC: 47 U/L (ref 12–78)
ANION GAP SERPL CALCULATED.3IONS-SCNC: 5 MMOL/L (ref 4–13)
AST SERPL W P-5'-P-CCNC: 23 U/L (ref 5–45)
BACTERIA UR QL AUTO: NORMAL /HPF
BILIRUB SERPL-MCNC: 0.44 MG/DL (ref 0.2–1)
BILIRUB UR QL STRIP: NEGATIVE
BUN SERPL-MCNC: 12 MG/DL (ref 5–25)
CALCIUM SERPL-MCNC: 9.4 MG/DL (ref 8.3–10.1)
CHLORIDE SERPL-SCNC: 109 MMOL/L (ref 100–108)
CHOLEST SERPL-MCNC: 213 MG/DL (ref 50–200)
CLARITY UR: CLEAR
CO2 SERPL-SCNC: 25 MMOL/L (ref 21–32)
COLOR UR: YELLOW
CREAT SERPL-MCNC: 0.81 MG/DL (ref 0.6–1.3)
CRP SERPL QL: 4.1 MG/L
ERYTHROCYTE [DISTWIDTH] IN BLOOD BY AUTOMATED COUNT: 12.8 % (ref 11.6–15.1)
EST. AVERAGE GLUCOSE BLD GHB EST-MCNC: 108 MG/DL
GFR SERPL CREATININE-BSD FRML MDRD: 74 ML/MIN/1.73SQ M
GLUCOSE P FAST SERPL-MCNC: 101 MG/DL (ref 65–99)
GLUCOSE UR STRIP-MCNC: NEGATIVE MG/DL
HBA1C MFR BLD: 5.4 %
HCT VFR BLD AUTO: 41.2 % (ref 34.8–46.1)
HCV AB SER QL: NORMAL
HDLC SERPL-MCNC: 47 MG/DL
HGB BLD-MCNC: 13.5 G/DL (ref 11.5–15.4)
HGB UR QL STRIP.AUTO: NEGATIVE
HYALINE CASTS #/AREA URNS LPF: NORMAL /LPF
KETONES UR STRIP-MCNC: NEGATIVE MG/DL
LDLC SERPL CALC-MCNC: 114 MG/DL (ref 0–100)
LEUKOCYTE ESTERASE UR QL STRIP: ABNORMAL
MCH RBC QN AUTO: 31 PG (ref 26.8–34.3)
MCHC RBC AUTO-ENTMCNC: 32.8 G/DL (ref 31.4–37.4)
MCV RBC AUTO: 95 FL (ref 82–98)
NITRITE UR QL STRIP: NEGATIVE
NON-SQ EPI CELLS URNS QL MICRO: NORMAL /HPF
NONHDLC SERPL-MCNC: 166 MG/DL
PH UR STRIP.AUTO: 6 [PH]
PLATELET # BLD AUTO: 229 THOUSANDS/UL (ref 149–390)
PMV BLD AUTO: 10.1 FL (ref 8.9–12.7)
POTASSIUM SERPL-SCNC: 4 MMOL/L (ref 3.5–5.3)
PROT SERPL-MCNC: 7.2 G/DL (ref 6.4–8.2)
PROT UR STRIP-MCNC: NEGATIVE MG/DL
RBC # BLD AUTO: 4.36 MILLION/UL (ref 3.81–5.12)
RBC #/AREA URNS AUTO: NORMAL /HPF
SODIUM SERPL-SCNC: 139 MMOL/L (ref 136–145)
SP GR UR STRIP.AUTO: 1.02 (ref 1–1.03)
TRIGL SERPL-MCNC: 262 MG/DL
TSH SERPL DL<=0.05 MIU/L-ACNC: 3.51 UIU/ML (ref 0.36–3.74)
UROBILINOGEN UR QL STRIP.AUTO: 0.2 E.U./DL
VIT B12 SERPL-MCNC: 837 PG/ML (ref 100–900)
WBC # BLD AUTO: 7.52 THOUSAND/UL (ref 4.31–10.16)
WBC #/AREA URNS AUTO: NORMAL /HPF

## 2021-07-29 PROCEDURE — 36415 COLL VENOUS BLD VENIPUNCTURE: CPT

## 2021-07-29 PROCEDURE — 85027 COMPLETE CBC AUTOMATED: CPT

## 2021-07-29 PROCEDURE — 80053 COMPREHEN METABOLIC PANEL: CPT

## 2021-07-29 PROCEDURE — 83036 HEMOGLOBIN GLYCOSYLATED A1C: CPT

## 2021-07-29 PROCEDURE — 82607 VITAMIN B-12: CPT

## 2021-07-29 PROCEDURE — 80061 LIPID PANEL: CPT

## 2021-07-29 PROCEDURE — 86140 C-REACTIVE PROTEIN: CPT

## 2021-07-29 PROCEDURE — 84165 PROTEIN E-PHORESIS SERUM: CPT | Performed by: PATHOLOGY

## 2021-07-29 PROCEDURE — 81001 URINALYSIS AUTO W/SCOPE: CPT | Performed by: FAMILY MEDICINE

## 2021-07-29 PROCEDURE — 84166 PROTEIN E-PHORESIS/URINE/CSF: CPT | Performed by: PATHOLOGY

## 2021-07-29 PROCEDURE — 84443 ASSAY THYROID STIM HORMONE: CPT

## 2021-07-29 PROCEDURE — 82306 VITAMIN D 25 HYDROXY: CPT

## 2021-07-29 PROCEDURE — 86803 HEPATITIS C AB TEST: CPT

## 2021-07-29 PROCEDURE — 84165 PROTEIN E-PHORESIS SERUM: CPT

## 2021-07-29 PROCEDURE — 84166 PROTEIN E-PHORESIS/URINE/CSF: CPT | Performed by: FAMILY MEDICINE

## 2021-07-30 LAB
ALBUMIN SERPL ELPH-MCNC: 4.26 G/DL (ref 3.5–5)
ALBUMIN SERPL ELPH-MCNC: 61.7 % (ref 52–65)
ALBUMIN UR ELPH-MCNC: 100 %
ALPHA1 GLOB MFR UR ELPH: 0 %
ALPHA1 GLOB SERPL ELPH-MCNC: 0.3 G/DL (ref 0.1–0.4)
ALPHA1 GLOB SERPL ELPH-MCNC: 4.4 % (ref 2.5–5)
ALPHA2 GLOB MFR UR ELPH: 0 %
ALPHA2 GLOB SERPL ELPH-MCNC: 0.78 G/DL (ref 0.4–1.2)
ALPHA2 GLOB SERPL ELPH-MCNC: 11.3 % (ref 7–13)
B-GLOBULIN MFR UR ELPH: 0 %
BETA GLOB ABNORMAL SERPL ELPH-MCNC: 0.43 G/DL (ref 0.4–0.8)
BETA1 GLOB SERPL ELPH-MCNC: 6.2 % (ref 5–13)
BETA2 GLOB SERPL ELPH-MCNC: 5.4 % (ref 2–8)
BETA2+GAMMA GLOB SERPL ELPH-MCNC: 0.37 G/DL (ref 0.2–0.5)
GAMMA GLOB ABNORMAL SERPL ELPH-MCNC: 0.76 G/DL (ref 0.5–1.6)
GAMMA GLOB MFR UR ELPH: 0 %
GAMMA GLOB SERPL ELPH-MCNC: 11 % (ref 12–22)
IGG/ALB SER: 1.61 {RATIO} (ref 1.1–1.8)
PROT PATTERN SERPL ELPH-IMP: ABNORMAL
PROT PATTERN UR ELPH-IMP: NORMAL
PROT SERPL-MCNC: 6.9 G/DL (ref 6.4–8.2)
PROT UR-MCNC: 8 MG/DL

## 2021-08-02 DIAGNOSIS — R73.9 ELEVATED BLOOD SUGAR: Primary | ICD-10-CM

## 2021-08-02 DIAGNOSIS — R79.82 ELEVATED C-REACTIVE PROTEIN (CRP): ICD-10-CM

## 2021-08-05 ENCOUNTER — TELEPHONE (OUTPATIENT)
Dept: FAMILY MEDICINE CLINIC | Facility: CLINIC | Age: 71
End: 2021-08-05

## 2021-08-05 NOTE — TELEPHONE ENCOUNTER
I don't know exactly what "neurologidcal" blood work (was to be done - I just did the 'usual' scans), so ??     Ok to call in/put in Prvachol 40 mg OD to [address]

## 2021-08-05 NOTE — TELEPHONE ENCOUNTER
Pt called back - she is looking for more information     Vit D levels low - she will increase to the 5000U per Dr Roseanne Schofield recommended pt to increase the Pravastatin to 40mg   1  Pt is currently taking 10mg Monday to Friday and 20mg on Saturday   2  If pt is to increase [facility]    3   Pt has Neurological issues and bloodwork was supposed to be checked for that, did that come back normal?    Please call pt back - [phone]

## 2021-08-05 NOTE — TELEPHONE ENCOUNTER
Mrs Alissa Galeazzi just in - after about 10 yrs     Vit D too low:  take 5,000 IU Vit D3 once a day x bout one yr and mahsa   Good that Dr Marlo Connor put on Pravastatin 20 mg OD -- prob needs 40 mg OD  C-reactive prot slightly elevated - usually means some inflam in body, usually musculoskeletal in nature (like, arthritis, etc  )  Could be anything tho   Just repeat CRP, sed rate , fbs, do A1c in 3 mo (go ahead, Emily, and put in computer)

## 2021-08-06 DIAGNOSIS — K21.9 GASTROESOPHAGEAL REFLUX DISEASE, UNSPECIFIED WHETHER ESOPHAGITIS PRESENT: ICD-10-CM

## 2021-08-06 DIAGNOSIS — E78.2 MIXED HYPERLIPIDEMIA: Primary | ICD-10-CM

## 2021-08-06 DIAGNOSIS — E78.2 MIXED HYPERLIPIDEMIA: ICD-10-CM

## 2021-08-06 RX ORDER — PRAVASTATIN SODIUM 40 MG
40 TABLET ORAL DAILY
COMMUNITY
End: 2021-08-06 | Stop reason: SDUPTHER

## 2021-08-06 RX ORDER — PRAVASTATIN SODIUM 20 MG
20 TABLET ORAL DAILY
Qty: 90 TABLET | Refills: 3 | Status: SHIPPED | OUTPATIENT
Start: 2021-08-06

## 2021-08-06 RX ORDER — PRAVASTATIN SODIUM 40 MG
40 TABLET ORAL DAILY
Qty: 90 TABLET | Refills: 1 | Status: SHIPPED | OUTPATIENT
Start: 2021-08-06 | End: 2021-08-06 | Stop reason: SDUPTHER

## 2021-08-06 RX ORDER — PRAVASTATIN SODIUM 40 MG
40 TABLET ORAL DAILY
Qty: 90 TABLET | Refills: 1 | Status: SHIPPED | OUTPATIENT
Start: 2021-08-06 | End: 2021-08-06

## 2021-08-06 RX ORDER — FAMOTIDINE 40 MG/1
40 TABLET, FILM COATED ORAL DAILY
Qty: 90 TABLET | Refills: 3 | Status: SHIPPED | OUTPATIENT
Start: 2021-08-06

## 2021-08-06 NOTE — TELEPHONE ENCOUNTER
Note to chart:  Patient called today August 6th to review dose of Pravachol and discuss lab results  I did all of this today 4:45 p m  Scarkendrick Jason Cholesterol 214 compared to 196 one year ago  Patient taking Pravachol 10 mg once daily but 20 mg 2 days a week--ordered by Dr Marol Connor  I will increase that to 20 mg once daily now  Discussed triglyceride level of 337 last year which is now down nicely but still way too high  Thinking is to avoid more toxic liver medication and just go with over-the-counter fish oil which she is doing but that caused nausea  I do not think she is taking anything now she prefers to just concentrate on the cholesterol level  Also she would like Pepcid 40 mg once daily called in as the 20 mg 2 times a day over the counter dose is a bit expensive  We discussed all laboratory studies and will keep her on this higher dose of Pravachol and recheck lipid profile and CMP in 4 months when she gets her A1c, sed rate, and C reactive protein checked  These labs should be done about the 1st week of December I will see her the 2nd week  This is simply documentation note for the chart      Yassine Hogue

## 2021-11-20 ENCOUNTER — IMMUNIZATIONS (OUTPATIENT)
Dept: FAMILY MEDICINE CLINIC | Facility: HOSPITAL | Age: 71
End: 2021-11-20

## 2021-11-20 DIAGNOSIS — Z23 ENCOUNTER FOR IMMUNIZATION: Primary | ICD-10-CM

## 2021-11-20 PROCEDURE — 0001A COVID-19 PFIZER VACC 0.3 ML: CPT

## 2021-11-20 PROCEDURE — 91300 COVID-19 PFIZER VACC 0.3 ML: CPT

## 2021-12-07 ENCOUNTER — APPOINTMENT (OUTPATIENT)
Dept: LAB | Facility: AMBULARY SURGERY CENTER | Age: 71
End: 2021-12-07
Payer: MEDICARE

## 2021-12-07 DIAGNOSIS — R73.9 ELEVATED BLOOD SUGAR: ICD-10-CM

## 2021-12-07 DIAGNOSIS — E78.2 MIXED HYPERLIPIDEMIA: ICD-10-CM

## 2021-12-07 DIAGNOSIS — R79.82 ELEVATED C-REACTIVE PROTEIN (CRP): ICD-10-CM

## 2021-12-07 LAB
ALBUMIN SERPL BCP-MCNC: 3.6 G/DL (ref 3.5–5)
ALP SERPL-CCNC: 65 U/L (ref 46–116)
ALT SERPL W P-5'-P-CCNC: 37 U/L (ref 12–78)
ANION GAP SERPL CALCULATED.3IONS-SCNC: 6 MMOL/L (ref 4–13)
AST SERPL W P-5'-P-CCNC: 21 U/L (ref 5–45)
BILIRUB SERPL-MCNC: 0.48 MG/DL (ref 0.2–1)
BUN SERPL-MCNC: 11 MG/DL (ref 5–25)
CALCIUM SERPL-MCNC: 9.1 MG/DL (ref 8.3–10.1)
CHLORIDE SERPL-SCNC: 109 MMOL/L (ref 100–108)
CHOLEST SERPL-MCNC: 198 MG/DL
CO2 SERPL-SCNC: 25 MMOL/L (ref 21–32)
CREAT SERPL-MCNC: 0.85 MG/DL (ref 0.6–1.3)
CRP SERPL QL: <3 MG/L
ERYTHROCYTE [SEDIMENTATION RATE] IN BLOOD: 15 MM/HOUR (ref 0–29)
GFR SERPL CREATININE-BSD FRML MDRD: 69 ML/MIN/1.73SQ M
GLUCOSE P FAST SERPL-MCNC: 94 MG/DL (ref 65–99)
HDLC SERPL-MCNC: 51 MG/DL
LDLC SERPL CALC-MCNC: 108 MG/DL (ref 0–100)
NONHDLC SERPL-MCNC: 147 MG/DL
POTASSIUM SERPL-SCNC: 3.7 MMOL/L (ref 3.5–5.3)
PROT SERPL-MCNC: 6.9 G/DL (ref 6.4–8.2)
SODIUM SERPL-SCNC: 140 MMOL/L (ref 136–145)
TRIGL SERPL-MCNC: 196 MG/DL

## 2021-12-07 PROCEDURE — 85652 RBC SED RATE AUTOMATED: CPT

## 2021-12-07 PROCEDURE — 86140 C-REACTIVE PROTEIN: CPT

## 2021-12-07 PROCEDURE — 80061 LIPID PANEL: CPT

## 2021-12-07 PROCEDURE — 83036 HEMOGLOBIN GLYCOSYLATED A1C: CPT

## 2021-12-07 PROCEDURE — 80053 COMPREHEN METABOLIC PANEL: CPT

## 2021-12-07 PROCEDURE — 36415 COLL VENOUS BLD VENIPUNCTURE: CPT

## 2021-12-08 LAB
EST. AVERAGE GLUCOSE BLD GHB EST-MCNC: 111 MG/DL
HBA1C MFR BLD: 5.5 %

## 2021-12-13 ENCOUNTER — OFFICE VISIT (OUTPATIENT)
Dept: FAMILY MEDICINE CLINIC | Facility: CLINIC | Age: 71
End: 2021-12-13
Payer: MEDICARE

## 2021-12-13 VITALS
BODY MASS INDEX: 30.56 KG/M2 | RESPIRATION RATE: 16 BRPM | TEMPERATURE: 98.8 F | DIASTOLIC BLOOD PRESSURE: 80 MMHG | WEIGHT: 179 LBS | HEIGHT: 64 IN | OXYGEN SATURATION: 98 % | SYSTOLIC BLOOD PRESSURE: 122 MMHG | HEART RATE: 79 BPM

## 2021-12-13 DIAGNOSIS — Z79.899 ENCOUNTER FOR LONG-TERM (CURRENT) USE OF MEDICATIONS: ICD-10-CM

## 2021-12-13 DIAGNOSIS — K21.9 CHRONIC GERD: ICD-10-CM

## 2021-12-13 DIAGNOSIS — K21.9 GASTROESOPHAGEAL REFLUX DISEASE, UNSPECIFIED WHETHER ESOPHAGITIS PRESENT: ICD-10-CM

## 2021-12-13 DIAGNOSIS — E78.2 MIXED HYPERLIPIDEMIA: ICD-10-CM

## 2021-12-13 DIAGNOSIS — Z23 NEED FOR VACCINATION: Primary | ICD-10-CM

## 2021-12-13 PROCEDURE — 90662 IIV NO PRSV INCREASED AG IM: CPT | Performed by: FAMILY MEDICINE

## 2021-12-13 PROCEDURE — 99214 OFFICE O/P EST MOD 30 MIN: CPT | Performed by: FAMILY MEDICINE

## 2021-12-13 PROCEDURE — G0008 ADMIN INFLUENZA VIRUS VAC: HCPCS | Performed by: FAMILY MEDICINE

## 2021-12-13 RX ORDER — FAMOTIDINE 20 MG/1
20 TABLET, FILM COATED ORAL 2 TIMES DAILY
Status: DISCONTINUED | OUTPATIENT
Start: 2021-12-13 | End: 2021-12-13

## 2021-12-13 RX ORDER — IPRATROPIUM BROMIDE 42 UG/1
2 SPRAY, METERED NASAL 4 TIMES DAILY
COMMUNITY

## 2021-12-13 RX ORDER — FAMOTIDINE 20 MG/1
20 TABLET, FILM COATED ORAL 2 TIMES DAILY
Qty: 180 TABLET | Refills: 3 | Status: SHIPPED | OUTPATIENT
Start: 2021-12-13

## 2022-08-26 DIAGNOSIS — E78.2 MIXED HYPERLIPIDEMIA: ICD-10-CM

## 2022-08-26 RX ORDER — PRAVASTATIN SODIUM 20 MG
20 TABLET ORAL DAILY
Qty: 90 TABLET | Refills: 3 | Status: CANCELLED | OUTPATIENT
Start: 2022-08-26

## 2022-08-26 RX ORDER — PRAVASTATIN SODIUM 20 MG
TABLET ORAL
Qty: 90 TABLET | Refills: 0 | Status: SHIPPED | OUTPATIENT
Start: 2022-08-26

## 2022-11-15 DIAGNOSIS — E78.2 MIXED HYPERLIPIDEMIA: ICD-10-CM

## 2022-11-16 RX ORDER — PRAVASTATIN SODIUM 20 MG
20 TABLET ORAL DAILY
Qty: 90 TABLET | Refills: 0 | Status: SHIPPED | OUTPATIENT
Start: 2022-11-16

## 2022-11-29 ENCOUNTER — RA CDI HCC (OUTPATIENT)
Dept: OTHER | Facility: HOSPITAL | Age: 72
End: 2022-11-29

## 2022-11-29 NOTE — PROGRESS NOTES
[address]  coding opportunities       Chart reviewed, no opportunity found: CHART REVIEWED, NO OPPORTUNITY FOUND        Patients Insurance     Medicare Insurance: Medicare

## 2022-12-06 ENCOUNTER — OFFICE VISIT (OUTPATIENT)
Dept: FAMILY MEDICINE CLINIC | Facility: CLINIC | Age: 72
End: 2022-12-06

## 2022-12-06 VITALS
SYSTOLIC BLOOD PRESSURE: 130 MMHG | OXYGEN SATURATION: 97 % | RESPIRATION RATE: 18 BRPM | WEIGHT: 172 LBS | HEIGHT: 64 IN | DIASTOLIC BLOOD PRESSURE: 70 MMHG | HEART RATE: 91 BPM | TEMPERATURE: 97.6 F | BODY MASS INDEX: 29.37 KG/M2

## 2022-12-06 DIAGNOSIS — K21.9 CHRONIC GERD: ICD-10-CM

## 2022-12-06 DIAGNOSIS — Z79.899 ENCOUNTER FOR LONG-TERM (CURRENT) USE OF MEDICATIONS: ICD-10-CM

## 2022-12-06 DIAGNOSIS — E55.9 VITAMIN D INSUFFICIENCY: ICD-10-CM

## 2022-12-06 DIAGNOSIS — Z23 ENCOUNTER FOR IMMUNIZATION: Primary | ICD-10-CM

## 2022-12-06 DIAGNOSIS — E78.2 MIXED HYPERLIPIDEMIA: ICD-10-CM

## 2022-12-06 DIAGNOSIS — R53.83 FATIGUE, UNSPECIFIED TYPE: ICD-10-CM

## 2022-12-06 DIAGNOSIS — R73.9 ELEVATED BLOOD SUGAR: ICD-10-CM

## 2022-12-06 NOTE — PROGRESS NOTES
Assessment/Plan:72 y o female, in NAD, only persistent GERD and long standing pyrosis, worsening with wt gain  In 1's and 2000, was starving herself trying to [facility] a man, so he left the state,and she just eats  Long talk re wt and losing wt  Wants to lose wt, but hard to do  Only real prob:  LBP, which "walking sticks"  1  Encounter for immunization  -     influenza vaccine, high-dose, PF 0 7 mL (FLUZONE HIGH-DOSE)    2  Chronic GERD    3  Mixed hyperlipidemia  -     Comprehensive metabolic panel; Future  -     Lipid panel; Future    4  Encounter for long-term (current) use of medications    5  Fatigue, unspecified type  -     CBC; Future  -     UA w Reflex to Microscopic w Reflex to Culture  -     TSH, 3rd generation; Future    6  Vitamin D insufficiency  -     Vitamin D 25 hydroxy; Future    7  Elevated blood sugar  -     Hemoglobin A1C; Future        Subjective:      Patient ID: Romero Pabon is a 67 y o  female  HPI    The following portions of the patient's history were reviewed and updated as appropriate: She  has a past medical history of History of echocardiogram (04/06/2018)  She   Patient Active Problem List    Diagnosis Date Noted   • Neuropathy 01/26/2021   • Paresthesia 01/26/2021   • Hyperesthesia 01/26/2021   • Abnormal EKG 06/25/2020   • BMI 32 0-32 9,adult 06/25/2020   • Ex-smoker 06/25/2020   • Frequent PVCs 06/25/2020   • Mixed hyperlipidemia 06/25/2020     She  has a past surgical history that includes Sinus surgery; Bunionectomy; and Foot surgery (Bilateral)  Her family history includes Heart attack in her father; Heart disease in her father; Valvular heart disease in her father  She  reports that she has quit smoking  Her smoking use included cigarettes  She has a 5 00 pack-year smoking history  She has never used smokeless tobacco  She reports current alcohol use  No history on file for drug use    Current Outpatient Medications   Medication Sig Dispense Refill   • famotidine (PEPCID) 20 mg tablet Take 1 tablet by mouth twice daily 180 tablet 0   • ipratropium (ATROVENT) 0 06 % nasal spray 2 sprays into each nostril 4 (four) times a day     • pravastatin (PRAVACHOL) 20 mg tablet Take 1 tablet (20 mg total) by mouth daily 90 tablet 0   • triamcinolone (KENALOG) 0 1 % cream APPLY CREAM EXTERNALLY TWICE DAILY TO ECZEMA UP TO 2 WEEKS MONTH AS NEEDED     • famotidine (PEPCID) 40 MG tablet Take 1 tablet (40 mg total) by mouth daily 90 tablet 3     No current facility-administered medications for this visit  Current Outpatient Medications on File Prior to Visit   Medication Sig   • famotidine (PEPCID) 20 mg tablet Take 1 tablet by mouth twice daily   • ipratropium (ATROVENT) 0 06 % nasal spray 2 sprays into each nostril 4 (four) times a day   • pravastatin (PRAVACHOL) 20 mg tablet Take 1 tablet (20 mg total) by mouth daily   • triamcinolone (KENALOG) 0 1 % cream APPLY CREAM EXTERNALLY TWICE DAILY TO ECZEMA UP TO 2 WEEKS MONTH AS NEEDED   • famotidine (PEPCID) 40 MG tablet Take 1 tablet (40 mg total) by mouth daily     No current facility-administered medications on file prior to visit  She has No Known Allergies       Review of Systems   Constitutional: Negative for chills, fatigue and fever  Appetite change: too good, likes chos  HENT: Negative for congestion, sinus pressure and sinus pain  Eyes: Negative  Respiratory: Negative for cough and shortness of breath  Cardiovascular: Negative for chest pain, palpitations and leg swelling  History of abnormal EKG and PVCs under care of Dr Mindy Blackburn   cardiac work up done without any major findings    Gastrointestinal: Negative for abdominal distention and abdominal pain  Endocrine:        Neuropathy    Genitourinary: Negative for difficulty urinating and flank pain  Musculoskeletal: Positive for arthralgias, back pain (lower back pain ) and myalgias          Numbness and tingling to right hand and arm   And Left lower extremities    Neurological: Negative for dizziness, weakness and headaches  Hematological: Negative for adenopathy  Psychiatric/Behavioral: Negative for sleep disturbance and suicidal ideas  The patient is nervous/anxious  Objective:      /70 (BP Location: Left arm, Patient Position: Sitting, Cuff Size: Standard)   Pulse 91   Temp 97 6 °F (36 4 °C) (Temporal)   Resp 18   Ht 5' 4" (1 626 m)   Wt 78 kg (172 lb)   SpO2 97%   BMI 29 52 kg/m²          Physical Exam  Vitals and nursing note reviewed  Constitutional:       General: She is not in acute distress  Appearance: Normal appearance  She is not ill-appearing, toxic-appearing or diaphoretic  HENT:      Head: Atraumatic  Eyes:      Extraocular Movements: Extraocular movements intact  Cardiovascular:      Rate and Rhythm: Normal rate and regular rhythm  Pulses: Normal pulses  Heart sounds: Normal heart sounds  Comments: With history of PVCs   Pulmonary:      Effort: Pulmonary effort is normal       Breath sounds: Normal breath sounds  Abdominal:      Palpations: Abdomen is soft  Musculoskeletal:         General: Normal range of motion  Cervical back: Normal range of motion and neck supple  Comments: Numbness and tingling noted to right arm and left lower extremity    Skin:     General: Skin is warm  Neurological:      General: No focal deficit present  Mental Status: She is alert and oriented to person, place, and time  Mental status is at baseline  Psychiatric:         Mood and Affect: Mood normal          Behavior: Behavior normal          Thought Content:  Thought content normal          Judgment: Judgment normal          45 min with pt    This time was spent reviewing previous records, reviewing previous laboratory and other tests, taking history from patient, examination of patient, discussion of prognosis and treatment, ordering laboratory tests, ordering medications, and completion of the medical record

## 2022-12-06 NOTE — PROGRESS NOTES
Assessment and Plan:     Problem List Items Addressed This Visit        Other    Mixed hyperlipidemia    Relevant Orders    Comprehensive metabolic panel    Lipid panel   Other Visit Diagnoses     Encounter for immunization    -  Primary    Relevant Orders    influenza vaccine, high-dose, PF 0 7 mL (FLUZONE HIGH-DOSE) (Completed)    Chronic GERD        Encounter for long-term (current) use of medications        Fatigue, unspecified type        Relevant Orders    CBC    UA w Reflex to Microscopic w Reflex to Culture    TSH, 3rd generation    Vitamin D insufficiency        Relevant Orders    Vitamin D 25 hydroxy    Elevated blood sugar        Relevant Orders    Hemoglobin A1C        BMI Counseling: Body mass index is 29 52 kg/m²  The BMI is above normal  Nutrition recommendations include decreasing portion sizes, encouraging healthy choices of fruits and vegetables, decreasing fast food intake, consuming healthier snacks, limiting drinks that contain sugar, moderation in carbohydrate intake, increasing intake of lean protein, reducing intake of saturated and trans fat and reducing intake of cholesterol  Exercise recommendations include moderate physical activity 150 minutes/week and exercising 3-5 times per week  No pharmacotherapy was ordered  Rationale for BMI follow-up plan is due to patient being overweight or obese  Preventive health issues were discussed with patient, and age appropriate screening tests were ordered as noted in patient's After Visit Summary  Personalized health advice and appropriate referrals for health education or preventive services given if needed, as noted in patient's After Visit Summary       History of Present Illness:     Patient presents for a Medicare Wellness Visit    HPI   Patient Care Team:  Dawood Bar DO as PCP - General (Family Medicine)     Review of Systems:     Review of Systems     Problem List:     Patient Active Problem List   Diagnosis   • Abnormal EKG   • BMI 32 0-32 9,adult   • Ex-smoker   • Frequent PVCs   • Mixed hyperlipidemia   • Neuropathy   • Paresthesia   • Hyperesthesia      Past Medical and Surgical History:     Past Medical History:   Diagnosis Date   • History of echocardiogram 04/06/2018     Left ventricular function is well preserved with an estimated ejection fraction of 60-65%  There is mild left ventricular hypertrophy  The left atrium is top normal in size  Left ventricular diastolic dysfunction is suggested by Doppler analysis        Past Surgical History:   Procedure Laterality Date   • BUNIONECTOMY     • FOOT SURGERY Bilateral    • SINUS SURGERY      in her 29's      Family History:     Family History   Problem Relation Age of Onset   • Heart disease Father    • Valvular heart disease Father         replacement in his 63's   • Heart attack Father       Social History:     Social History     Socioeconomic History   • Marital status:      Spouse name: None   • Number of children: 2   • Years of education: None   • Highest education level: None   Occupational History   • None   Tobacco Use   • Smoking status: Former     Packs/day: 0 25     Years: 20 00     Pack years: 5 00     Types: Cigarettes   • Smokeless tobacco: Never   • Tobacco comments:     Quit at age 61 per [place]   Substance and Sexual Activity   • Alcohol use: Yes     Comment: occasional   • Drug use: None   • Sexual activity: None   Other Topics Concern   • None   Social History Narrative   • None     Social Determinants of Health     Financial Resource Strain: Unknown   • Difficulty of Paying Living Expenses: Patient refused   Food Insecurity: Not on file   Transportation Needs: Unknown   • Lack of Transportation (Medical): Patient refused   • Lack of Transportation (Non-Medical): Patient refused   Physical Activity: Not on file   Stress: Not on file   Social Connections: Not on file   Intimate Partner Violence: Not on file   Housing Stability: Not on file      Medications and Allergies:     Current Outpatient Medications   Medication Sig Dispense Refill   • famotidine (PEPCID) 20 mg tablet Take 1 tablet by mouth twice daily 180 tablet 0   • ipratropium (ATROVENT) 0 06 % nasal spray 2 sprays into each nostril 4 (four) times a day     • pravastatin (PRAVACHOL) 20 mg tablet Take 1 tablet (20 mg total) by mouth daily 90 tablet 0   • triamcinolone (KENALOG) 0 1 % cream APPLY CREAM EXTERNALLY TWICE DAILY TO ECZEMA UP TO 2 WEEKS MONTH AS NEEDED     • famotidine (PEPCID) 40 MG tablet Take 1 tablet (40 mg total) by mouth daily 90 tablet 3     No current facility-administered medications for this visit  No Known Allergies   Immunizations:     Immunization History   Administered Date(s) Administered   • COVID-19 PFIZER VACCINE 0 3 ML IM 02/17/2021, 03/08/2021, 11/20/2021   • Influenza, high dose seasonal 0 7 mL 09/22/2020, 12/13/2021, 12/06/2022      Health Maintenance:         Topic Date Due   • Breast Cancer Screening: Mammogram  Never done   • Colorectal Cancer Screening  Never done   • Hepatitis C Screening  Completed         Topic Date Due   • Hepatitis B Vaccine (1 of 3 - 3-dose series) Never done   • Pneumococcal Vaccine: 65+ Years (1 - PCV) Never done   • COVID-19 Vaccine (4 - Booster for Daniels Peter series) 03/20/2022      Medicare Screening Tests and Risk Assessments:     Melvin Barroso is here for her Subsequent Wellness visit  Health Risk Assessment:   Patient rates overall health as very good  Patient feels that their physical health rating is same  Patient is very satisfied with their life  Eyesight was rated as same  Hearing was rated as same  Patient feels that their emotional and mental health rating is same  Patients states they are never, rarely angry  Patient states they are never, rarely unusually tired/fatigued  Pain experienced in the last 7 days has been some  Patient's pain rating has been 1/10  Patient states that she has experienced no weight loss or gain in last 6 months  Fall Risk Screening: In the past year, patient has experienced: no history of falling in past year      Urinary Incontinence Screening:   Patient has not leaked urine accidently in the last six months  Home Safety:  Patient does not have trouble with stairs inside or outside of their home  Patient has working smoke alarms and has no working carbon monoxide detector  Home safety hazards include: none  Nutrition:   Current diet is Regular and Limited junk food  Medications:   Patient is not currently taking any over-the-counter supplements  Patient is able to manage medications  Activities of Daily Living (ADLs)/Instrumental Activities of Daily Living (IADLs):   Walk and transfer into and out of bed and chair?: Yes  Dress and groom yourself?: Yes    Bathe or shower yourself?: Yes    Feed yourself? Yes  Do your laundry/housekeeping?: Yes  Manage your money, pay your bills and track your expenses?: Yes  Make your own meals?: Yes    Do your own shopping?: Yes    Previous Hospitalizations:   Any hospitalizations or ED visits within the last 12 months?: No      Advance Care Planning:   Living will: Yes    Durable POA for healthcare:  Yes    Advanced directive: Yes    Advanced directive counseling given: Yes    Five wishes given: Yes    Patient declined ACP directive: No    End of Life Decisions reviewed with patient: Yes    Provider agrees with end of life decisions: Yes      Cognitive Screening:   Provider or family/friend/caregiver concerned regarding cognition?: No    PREVENTIVE SCREENINGS      Cardiovascular Screening:    General: Screening Not Indicated, History Lipid Disorder and Risks and Benefits Discussed      Diabetes Screening:     General: Screening Current and Risks and Benefits Discussed      Colorectal Cancer Screening:     General: Risks and Benefits Discussed and Patient Declines      Breast Cancer Screening:     General: Risks and Benefits Discussed and Patient Declines      Cervical Cancer Screening:    General: Screening Not Indicated      Osteoporosis Screening:    General: Patient Declines      Abdominal Aortic Aneurysm (AAA) Screening:        General: Patient Declines      Lung Cancer Screening:     General: Screening Not Indicated and Risks and Benefits Discussed      Hepatitis C Screening:    General: Screening Current and Risks and Benefits Discussed    Hep C Screening Accepted: No     Screening, Brief Intervention, and Referral to Treatment (SBIRT)    Screening  Typical number of drinks in a day: 0  Typical number of drinks in a week: 0  Interpretation: Low risk drinking behavior  AUDIT-C Screenin) How often did you have a drink containing alcohol in the past year? monthly or less  2) How many drinks did you have on a typical day when you were drinking in the past year? 1 to 2  3) How often did you have 6 or more drinks on one occasion in the past year? never    AUDIT-C Score: 1  Interpretation: Score 0-2 (female): Negative screen for alcohol misuse    Single Item Drug Screening:  How often have you used an illegal drug (including marijuana) or a prescription medication for non-medical reasons in the past year? never    Single Item Drug Screen Score: 0  Interpretation: Negative screen for possible drug use disorder    Brief Intervention  Alcohol & drug use screenings were reviewed  No concerns regarding substance use disorder identified  Healthy alcohol use/limits discussed  Other Counseling Topics:   Car/seat belt/driving safety, skin self-exam, sunscreen and calcium and vitamin D intake and regular weightbearing exercise  No results found       Physical Exam:     /70 (BP Location: Left arm, Patient Position: Sitting, Cuff Size: Standard)   Pulse 91   Temp 97 6 °F (36 4 °C) (Temporal)   Resp 18   Ht 5' 4" (1 626 m)   Wt 78 kg (172 lb)   SpO2 97%   BMI 29 52 kg/m²     Physical Exam     Radhika León DO

## 2022-12-06 NOTE — PATIENT INSTRUCTIONS
Medicare Preventive Visit Patient Instructions  Thank you for completing your Welcome to Medicare Visit or Medicare Annual Wellness Visit today  Your next wellness visit will be due in one year (12/7/2023)  The screening/preventive services that you may require over the next 5-10 years are detailed below  Some tests may not apply to you based off risk factors and/or age  Screening tests ordered at today's visit but not completed yet may show as past due  Also, please note that scanned in results may not display below  Preventive Screenings:  Service Recommendations Previous Testing/Comments   Colorectal Cancer Screening  * Colonoscopy    * Fecal Occult Blood Test (FOBT)/Fecal Immunochemical Test (FIT)  * Fecal DNA/Cologuard Test  * Flexible Sigmoidoscopy Age: 39-70 years old   Colonoscopy: every 10 years (may be performed more frequently if at higher risk)  OR  FOBT/FIT: every 1 year  OR  Cologuard: every 3 years  OR  Sigmoidoscopy: every 5 years  Screening may be recommended earlier than age 39 if at higher risk for colorectal cancer  Also, an individualized decision between you and your healthcare provider will decide whether screening between the ages of 74-80 would be appropriate  Colonoscopy: Not on file  FOBT/FIT: Not on file  Cologuard: Not on file  Sigmoidoscopy: Not on file          Breast Cancer Screening Age: 36 years old  Frequency: every 1-2 years  Not required if history of left and right mastectomy Mammogram: Not on file        Cervical Cancer Screening Between the ages of 21-29, pap smear recommended once every 3 years  Between the ages of 33-67, can perform pap smear with HPV co-testing every 5 years     Recommendations may differ for women with a history of total hysterectomy, cervical cancer, or abnormal pap smears in past  Pap Smear: Not on file    Screening Not Indicated   Hepatitis C Screening Once for adults born between [facility]  More frequently in patients at high risk for Hepatitis C Hep C Antibody: 07/29/2021    Screening Current   Diabetes Screening 1-2 times per year if you're at risk for diabetes or have pre-diabetes Fasting glucose: 94 mg/dL (12/7/2021)  A1C: 5 5 % (12/7/2021)  Screening Current   Cholesterol Screening Once every 5 years if you don't have a lipid disorder  May order more often based on risk factors  Lipid panel: 12/07/2021    Screening Not Indicated  History Lipid Disorder     Other Preventive Screenings Covered by Medicare:  1  Abdominal Aortic Aneurysm (AAA) Screening: covered once if your at risk  You're considered to be at risk if you have a family history of AAA  2  Lung Cancer Screening: covers low dose CT scan once per year if you meet all of the following conditions: (1) Age 50-69; (2) No signs or symptoms of lung cancer; (3) Current smoker or have quit smoking within the last 15 years; (4) You have a tobacco smoking history of at least 20 pack years (packs per day multiplied by number of years you smoked); (5) You get a written order from a healthcare provider  3  Glaucoma Screening: covered annually if you're considered high risk: (1) You have diabetes OR (2) Family history of glaucoma OR (3)  aged 48 and older OR (3)  American aged 72 and older  3  Osteoporosis Screening: covered every 2 years if you meet one of the following conditions: (1) You're estrogen deficient and at risk for osteoporosis based off medical history and other findings; (2) Have a vertebral abnormality; (3) On glucocorticoid therapy for more than 3 months; (4) Have primary hyperparathyroidism; (5) On osteoporosis medications and need to assess response to drug therapy  · Last bone density test (DXA Scan): Not on file  5  HIV Screening: covered annually if you're between the age of 12-76  Also covered annually if you are younger than 13 and older than 72 with risk factors for HIV infection   For pregnant patients, it is covered up to 3 times per pregnancy  Immunizations:  Immunization Recommendations   Influenza Vaccine Annual influenza vaccination during flu season is recommended for all persons aged >= 6 months who do not have contraindications   Pneumococcal Vaccine   * Pneumococcal conjugate vaccine = PCV13 (Prevnar 13), PCV15 (Vaxneuvance), PCV20 (Prevnar 20)  * Pneumococcal polysaccharide vaccine = PPSV23 (Pneumovax) Adults 25-60 years old: 1-3 doses may be recommended based on certain risk factors  Adults 72 years old: 1-2 doses may be recommended based off what pneumonia vaccine you previously received   Hepatitis B Vaccine 3 dose series if at intermediate or high risk (ex: diabetes, end stage renal disease, liver disease)   Tetanus (Td) Vaccine - COST NOT COVERED BY MEDICARE PART B Following completion of primary series, a booster dose should be given every 10 years to maintain immunity against tetanus  Td may also be given as tetanus wound prophylaxis  Tdap Vaccine - COST NOT COVERED BY MEDICARE PART B Recommended at least once for all adults  For pregnant patients, recommended with each pregnancy  Shingles Vaccine (Shingrix) - COST NOT COVERED BY MEDICARE PART B  2 shot series recommended in those aged 48 and above     Health Maintenance Due:      Topic Date Due   • Breast Cancer Screening: Mammogram  Never done   • Colorectal Cancer Screening  Never done   • Hepatitis C Screening  Completed     Immunizations Due:      Topic Date Due   • Hepatitis B Vaccine (1 of 3 - 3-dose series) Never done   • Pneumococcal Vaccine: 65+ Years (1 - PCV) Never done   • COVID-19 Vaccine (4 - Booster for Pfizer series) 03/20/2022   • Influenza Vaccine (1) 09/01/2022     Advance Directives   What are advance directives? Advance directives are legal documents that state your wishes and plans for medical care  These plans are made ahead of time in case you lose your ability to make decisions for yourself   Advance directives can apply to any medical decision, such as the treatments you want, and if you want to donate organs  What are the types of advance directives? There are many types of advance directives, and each state has rules about how to use them  You may choose a combination of any of the following:  · Living will: This is a written record of the treatment you want  You can also choose which treatments you do not want, which to limit, and which to stop at a certain time  This includes surgery, medicine, IV fluid, and tube feedings  · Durable power of  for healthcare [facility]): This is a written record that states who you want to make healthcare choices for you when you are unable to make them for yourself  This person, called a proxy, is usually a family member or a friend  You may choose more than 1 proxy  · Do not resuscitate (DNR) order:  A DNR order is used in case your heart stops beating or you stop breathing  It is a request not to have certain forms of treatment, such as CPR  A DNR order may be included in other types of advance directives  · Medical directive: This covers the care that you want if you are in a coma, near death, or unable to make decisions for yourself  You can list the treatments you want for each condition  Treatment may include pain medicine, surgery, blood transfusions, dialysis, IV or tube feedings, and a ventilator (breathing machine)  · Values history: This document has questions about your views, beliefs, and how you feel and think about life  This information can help others choose the care that you would choose  Why are advance directives important? An advance directive helps you control your care  Although spoken wishes may be used, it is better to have your wishes written down  Spoken wishes can be misunderstood, or not followed  Treatments may be given even if you do not want them  An advance directive may make it easier for your family to make difficult choices about your care     Weight Management   Why it is important to manage your weight:  Being overweight increases your risk of health conditions such as heart disease, high blood pressure, type 2 diabetes, and certain types of cancer  It can also increase your risk for osteoarthritis, sleep apnea, and other respiratory problems  Aim for a slow, steady weight loss  Even a small amount of weight loss can lower your risk of health problems  How to lose weight safely:  A safe and healthy way to lose weight is to eat fewer calories and get regular exercise  You can lose up about 1 pound a week by decreasing the number of calories you eat by 500 calories each day  Healthy meal plan for weight management:  A healthy meal plan includes a variety of foods, contains fewer calories, and helps you stay healthy  A healthy meal plan includes the following:  · Eat whole-grain foods more often  A healthy meal plan should contain fiber  Fiber is the part of grains, fruits, and vegetables that is not broken down by your body  Whole-grain foods are healthy and provide extra fiber in your diet  Some examples of whole-grain foods are whole-wheat breads and pastas, oatmeal, brown rice, and bulgur  · Eat a variety of vegetables every day  Include dark, leafy greens such as spinach, kale, sara greens, and mustard greens  Eat yellow and orange vegetables such as carrots, sweet potatoes, and winter squash  · Eat a variety of fruits every day  Choose fresh or canned fruit (canned in its own juice or light syrup) instead of juice  Fruit juice has very little or no fiber  · Eat low-fat dairy foods  Drink fat-free (skim) milk or 1% milk  Eat fat-free yogurt and low-fat cottage cheese  Try low-fat cheeses such as mozzarella and other reduced-fat cheeses  · Choose meat and other protein foods that are low in fat  Choose beans or other legumes such as split peas or lentils  Choose fish, skinless poultry (chicken or turkey), or lean cuts of red meat (beef or pork)   Before you cook meat or poultry, cut off any visible fat  · Use less fat and oil  Try baking foods instead of frying them  Add less fat, such as margarine, sour cream, regular salad dressing and mayonnaise to foods  Eat fewer high-fat foods  Some examples of high-fat foods include french fries, doughnuts, ice cream, and cakes  · Eat fewer sweets  Limit foods and drinks that are high in sugar  This includes candy, cookies, regular soda, and sweetened drinks  Exercise:  Exercise at least 30 minutes per day on most days of the week  Some examples of exercise include walking, biking, dancing, and swimming  You can also fit in more physical activity by taking the stairs instead of the elevator or parking farther away from stores  Ask your healthcare provider about the best exercise plan for you  © Copyright [employer] 2018 Information is for End User's use only and may not be sold, redistributed or otherwise used for commercial purposes   All illustrations and images included in CareNotes® are the copyrighted property of A D A M [employer]  or [address] [place] [address]

## 2023-01-09 ENCOUNTER — APPOINTMENT (OUTPATIENT)
Dept: LAB | Facility: AMBULARY SURGERY CENTER | Age: 73
End: 2023-01-09

## 2023-01-09 DIAGNOSIS — E55.9 VITAMIN D INSUFFICIENCY: ICD-10-CM

## 2023-01-09 DIAGNOSIS — R73.9 ELEVATED BLOOD SUGAR: ICD-10-CM

## 2023-01-09 DIAGNOSIS — E78.2 MIXED HYPERLIPIDEMIA: ICD-10-CM

## 2023-01-09 DIAGNOSIS — R53.83 FATIGUE, UNSPECIFIED TYPE: ICD-10-CM

## 2023-01-09 LAB
25(OH)D3 SERPL-MCNC: 52.2 NG/ML (ref 30–100)
ALBUMIN SERPL BCP-MCNC: 3.7 G/DL (ref 3.5–5)
ALP SERPL-CCNC: 67 U/L (ref 46–116)
ALT SERPL W P-5'-P-CCNC: 36 U/L (ref 12–78)
ANION GAP SERPL CALCULATED.3IONS-SCNC: 7 MMOL/L (ref 4–13)
AST SERPL W P-5'-P-CCNC: 26 U/L (ref 5–45)
BILIRUB SERPL-MCNC: 0.45 MG/DL (ref 0.2–1)
BUN SERPL-MCNC: 12 MG/DL (ref 5–25)
CALCIUM SERPL-MCNC: 9.5 MG/DL (ref 8.3–10.1)
CHLORIDE SERPL-SCNC: 109 MMOL/L (ref 96–108)
CHOLEST SERPL-MCNC: 183 MG/DL
CO2 SERPL-SCNC: 26 MMOL/L (ref 21–32)
CREAT SERPL-MCNC: 0.9 MG/DL (ref 0.6–1.3)
ERYTHROCYTE [DISTWIDTH] IN BLOOD BY AUTOMATED COUNT: 12.4 % (ref 11.6–15.1)
GFR SERPL CREATININE-BSD FRML MDRD: 64 ML/MIN/1.73SQ M
GLUCOSE P FAST SERPL-MCNC: 107 MG/DL (ref 65–99)
HCT VFR BLD AUTO: 43.1 % (ref 34.8–46.1)
HDLC SERPL-MCNC: 42 MG/DL
HGB BLD-MCNC: 13.9 G/DL (ref 11.5–15.4)
LDLC SERPL CALC-MCNC: 71 MG/DL (ref 0–100)
MCH RBC QN AUTO: 30.3 PG (ref 26.8–34.3)
MCHC RBC AUTO-ENTMCNC: 32.3 G/DL (ref 31.4–37.4)
MCV RBC AUTO: 94 FL (ref 82–98)
NONHDLC SERPL-MCNC: 141 MG/DL
PLATELET # BLD AUTO: 261 THOUSANDS/UL (ref 149–390)
PMV BLD AUTO: 10.1 FL (ref 8.9–12.7)
POTASSIUM SERPL-SCNC: 4.6 MMOL/L (ref 3.5–5.3)
PROT SERPL-MCNC: 7.3 G/DL (ref 6.4–8.4)
RBC # BLD AUTO: 4.59 MILLION/UL (ref 3.81–5.12)
SODIUM SERPL-SCNC: 142 MMOL/L (ref 135–147)
TRIGL SERPL-MCNC: 349 MG/DL
TSH SERPL DL<=0.05 MIU/L-ACNC: 4.2 UIU/ML (ref 0.45–4.5)
WBC # BLD AUTO: 8.59 THOUSAND/UL (ref 4.31–10.16)

## 2023-01-10 DIAGNOSIS — K21.9 CHRONIC GERD: ICD-10-CM

## 2023-01-10 LAB
EST. AVERAGE GLUCOSE BLD GHB EST-MCNC: 114 MG/DL
HBA1C MFR BLD: 5.6 %

## 2023-01-10 RX ORDER — FAMOTIDINE 20 MG/1
20 TABLET, FILM COATED ORAL 2 TIMES DAILY
Qty: 180 TABLET | Refills: 1 | Status: SHIPPED | OUTPATIENT
Start: 2023-01-10

## 2023-02-06 DIAGNOSIS — E78.2 MIXED HYPERLIPIDEMIA: ICD-10-CM

## 2023-02-06 RX ORDER — PRAVASTATIN SODIUM 20 MG
20 TABLET ORAL DAILY
Qty: 90 TABLET | Refills: 3 | Status: SHIPPED | OUTPATIENT
Start: 2023-02-06

## 2023-06-26 ENCOUNTER — TRANSCRIBE ORDERS (OUTPATIENT)
Dept: FAMILY MEDICINE CLINIC | Facility: CLINIC | Age: 73
End: 2023-06-26

## 2023-06-26 DIAGNOSIS — Z12.31 ENCOUNTER FOR SCREENING MAMMOGRAM FOR MALIGNANT NEOPLASM OF BREAST: Primary | ICD-10-CM

## 2023-08-14 DIAGNOSIS — K21.9 CHRONIC GERD: ICD-10-CM

## 2023-08-14 RX ORDER — FAMOTIDINE 20 MG/1
20 TABLET, FILM COATED ORAL 2 TIMES DAILY
Qty: 180 TABLET | Refills: 3 | Status: SHIPPED | OUTPATIENT
Start: 2023-08-14

## 2023-10-10 ENCOUNTER — TELEPHONE (OUTPATIENT)
Age: 73
End: 2023-10-10

## 2023-10-10 DIAGNOSIS — R53.83 FATIGUE, UNSPECIFIED TYPE: ICD-10-CM

## 2023-10-10 DIAGNOSIS — E55.9 VITAMIN D INSUFFICIENCY: ICD-10-CM

## 2023-10-10 DIAGNOSIS — Z79.899 ENCOUNTER FOR LONG-TERM (CURRENT) USE OF MEDICATIONS: ICD-10-CM

## 2023-10-10 DIAGNOSIS — E78.2 MIXED HYPERLIPIDEMIA: ICD-10-CM

## 2023-10-10 DIAGNOSIS — Z13.89 SCREENING FOR BLOOD OR PROTEIN IN URINE: Primary | ICD-10-CM

## 2023-10-10 NOTE — TELEPHONE ENCOUNTER
Levon Rush is scheduled for her AWV Dec. 7th. She is requesting labs to be ordered so she can complete them before her appt. She would a like her labs to include her cholesterol levels. Thank you!

## 2023-11-27 ENCOUNTER — APPOINTMENT (OUTPATIENT)
Dept: LAB | Facility: AMBULARY SURGERY CENTER | Age: 73
End: 2023-11-27
Payer: MEDICARE

## 2023-11-27 DIAGNOSIS — E78.2 MIXED HYPERLIPIDEMIA: ICD-10-CM

## 2023-11-27 DIAGNOSIS — R53.83 FATIGUE, UNSPECIFIED TYPE: ICD-10-CM

## 2023-11-27 DIAGNOSIS — E55.9 VITAMIN D INSUFFICIENCY: ICD-10-CM

## 2023-11-27 LAB
25(OH)D3 SERPL-MCNC: 65.8 NG/ML (ref 30–100)
ALBUMIN SERPL BCP-MCNC: 4.3 G/DL (ref 3.5–5)
ALP SERPL-CCNC: 59 U/L (ref 34–104)
ALT SERPL W P-5'-P-CCNC: 25 U/L (ref 7–52)
ANION GAP SERPL CALCULATED.3IONS-SCNC: 9 MMOL/L
AST SERPL W P-5'-P-CCNC: 27 U/L (ref 13–39)
BILIRUB SERPL-MCNC: 0.53 MG/DL (ref 0.2–1)
BUN SERPL-MCNC: 12 MG/DL (ref 5–25)
CALCIUM SERPL-MCNC: 9.4 MG/DL (ref 8.4–10.2)
CHLORIDE SERPL-SCNC: 104 MMOL/L (ref 96–108)
CHOLEST SERPL-MCNC: 196 MG/DL
CO2 SERPL-SCNC: 28 MMOL/L (ref 21–32)
CREAT SERPL-MCNC: 0.89 MG/DL (ref 0.6–1.3)
ERYTHROCYTE [DISTWIDTH] IN BLOOD BY AUTOMATED COUNT: 12.7 % (ref 11.6–15.1)
GFR SERPL CREATININE-BSD FRML MDRD: 64 ML/MIN/1.73SQ M
GLUCOSE P FAST SERPL-MCNC: 98 MG/DL (ref 65–99)
HCT VFR BLD AUTO: 42.1 % (ref 34.8–46.1)
HDLC SERPL-MCNC: 50 MG/DL
HGB BLD-MCNC: 13.6 G/DL (ref 11.5–15.4)
LDLC SERPL CALC-MCNC: 104 MG/DL (ref 0–100)
MCH RBC QN AUTO: 30.6 PG (ref 26.8–34.3)
MCHC RBC AUTO-ENTMCNC: 32.3 G/DL (ref 31.4–37.4)
MCV RBC AUTO: 95 FL (ref 82–98)
NONHDLC SERPL-MCNC: 146 MG/DL
PLATELET # BLD AUTO: 251 THOUSANDS/UL (ref 149–390)
PMV BLD AUTO: 9.8 FL (ref 8.9–12.7)
POTASSIUM SERPL-SCNC: 4.4 MMOL/L (ref 3.5–5.3)
PROT SERPL-MCNC: 6.5 G/DL (ref 6.4–8.4)
RBC # BLD AUTO: 4.45 MILLION/UL (ref 3.81–5.12)
SODIUM SERPL-SCNC: 141 MMOL/L (ref 135–147)
TRIGL SERPL-MCNC: 211 MG/DL
TSH SERPL DL<=0.05 MIU/L-ACNC: 2.12 UIU/ML (ref 0.45–4.5)
WBC # BLD AUTO: 6.86 THOUSAND/UL (ref 4.31–10.16)

## 2023-11-27 PROCEDURE — 80053 COMPREHEN METABOLIC PANEL: CPT

## 2023-11-27 PROCEDURE — 82306 VITAMIN D 25 HYDROXY: CPT

## 2023-11-27 PROCEDURE — 85027 COMPLETE CBC AUTOMATED: CPT

## 2023-11-27 PROCEDURE — 80061 LIPID PANEL: CPT

## 2023-11-27 PROCEDURE — 36415 COLL VENOUS BLD VENIPUNCTURE: CPT

## 2023-11-27 PROCEDURE — 84443 ASSAY THYROID STIM HORMONE: CPT

## 2023-11-30 ENCOUNTER — RA CDI HCC (OUTPATIENT)
Dept: OTHER | Facility: HOSPITAL | Age: 73
End: 2023-11-30

## 2023-12-07 ENCOUNTER — OFFICE VISIT (OUTPATIENT)
Dept: FAMILY MEDICINE CLINIC | Facility: CLINIC | Age: 73
End: 2023-12-07
Payer: MEDICARE

## 2023-12-07 VITALS
SYSTOLIC BLOOD PRESSURE: 116 MMHG | TEMPERATURE: 97.2 F | HEART RATE: 75 BPM | HEIGHT: 64 IN | DIASTOLIC BLOOD PRESSURE: 74 MMHG | BODY MASS INDEX: 29.02 KG/M2 | OXYGEN SATURATION: 98 % | WEIGHT: 170 LBS

## 2023-12-07 DIAGNOSIS — J31.0 CHRONIC RHINITIS: ICD-10-CM

## 2023-12-07 DIAGNOSIS — E78.2 MIXED HYPERLIPIDEMIA: ICD-10-CM

## 2023-12-07 DIAGNOSIS — Z00.00 MEDICARE ANNUAL WELLNESS VISIT, SUBSEQUENT: ICD-10-CM

## 2023-12-07 DIAGNOSIS — Z23 ENCOUNTER FOR IMMUNIZATION: Primary | ICD-10-CM

## 2023-12-07 PROCEDURE — G0008 ADMIN INFLUENZA VIRUS VAC: HCPCS | Performed by: FAMILY MEDICINE

## 2023-12-07 PROCEDURE — 90662 IIV NO PRSV INCREASED AG IM: CPT | Performed by: FAMILY MEDICINE

## 2023-12-07 PROCEDURE — 99214 OFFICE O/P EST MOD 30 MIN: CPT | Performed by: FAMILY MEDICINE

## 2023-12-07 PROCEDURE — G0439 PPPS, SUBSEQ VISIT: HCPCS | Performed by: FAMILY MEDICINE

## 2023-12-07 RX ORDER — IPRATROPIUM BROMIDE 42 UG/1
2 SPRAY, METERED NASAL 4 TIMES DAILY
Qty: 15 ML | Refills: 3 | Status: SHIPPED | OUTPATIENT
Start: 2023-12-07

## 2023-12-07 RX ORDER — PRAVASTATIN SODIUM 40 MG
40 TABLET ORAL
Qty: 90 TABLET | Refills: 3 | Status: SHIPPED | OUTPATIENT
Start: 2023-12-07

## 2023-12-07 NOTE — PROGRESS NOTES
Assessment/Plan:          1. Encounter for immunization  Assessment & Plan:  Senior flu vaccine given today. Medicare wellness done today. Orders:  -     influenza vaccine, high-dose, PF 0.7 mL (FLUZONE HIGH-DOSE)    2. Medicare annual wellness visit, subsequent    3. Chronic rhinitis  Assessment & Plan:  Lifelong problem issue. She uses Atrovent 0.06 % spray as needed and fairly rarely but wants a refill today. Orders:  -     ipratropium (ATROVENT) 0.06 % nasal spray; 2 sprays into each nostril 4 (four) times a day    4. Mixed hyperlipidemia  Assessment & Plan:  Lipid profile of 11/27/2023 indicates cholesterol 196 mg/dL, triglycerides 211 mg/dL, HDL 50 mg/dL, and  mg/dL. Fully explained and discussed. Currently Pravachol 20 mg, we will increase it up to 40 mg. Orders:  -     pravastatin (PRAVACHOL) 40 mg tablet; Take 1 tablet (40 mg total) by mouth daily at bedtime        Health maintenance. She will receive her Tdap, pneumonia, and influenza vaccines today. She will receive her zoster vaccine and Prevnar 20 today. She was advised not to get all the vaccines together. She was recommended to do a self-breast exam. She will let us know if she changes her mind for a mammogram or Cologuard. Follow-up  The patient will follow up in 1 year. BMI Counseling: Body mass index is 29.18 kg/m². The BMI is above normal. Nutrition recommendations include decreasing portion sizes, encouraging healthy choices of fruits and vegetables, decreasing fast food intake, consuming healthier snacks, limiting drinks that contain sugar, moderation in carbohydrate intake, increasing intake of lean protein and reducing intake of saturated and trans fat. Exercise recommendations include moderate physical activity 150 minutes/week and exercising 3-5 times per week. No pharmacotherapy was ordered. Rationale for BMI follow-up plan is due to patient being overweight or obese.      Depression Screening and Follow-up Plan: Patient was screened for depression during today's encounter. They screened negative with a PHQ-2 score of 0. Subjective:       Patient ID: Phuong Mayorga is a 68 y.o. female is well-known to me for many years presents for follow up and evaluation of multiple medical concerns    The patient has chronic rhinitis and uses Atrovent nasal spray 0.6%. A few months ago, she experienced tooth sensitivity and a mild toothache without a clear dental cause. Although a minor crack was identified and repaired, it was noted that sinus issues, potentially involving the sinus touching the tooth roots, had caused difficulties in the past.    She takes famotidine 20 mg twice a day. She takes pravastatin 20 mg once a day for her cholesterol. She denies any side effects from the medication. She has never had a colonoscopy. She never had mammograms. Her last Pap smear was in 2002 or 2003. She adhered to a 30-day course of medication, but due to skin breakouts, she independently tapered off the pills. While the medication initially provided relief, it did not prompt a return to smoking. The side effect manifested as welts on her skin. She is a former smoker. Her mother, sister, and maternal grandfather had dementia. She has received her fifth COVID-19 vaccine last May or June. She received her influenza vaccine. On 11/27/2023, a lipid panel was conducted with the following results: cholesterol 196 mg/dL, triglycerides 211 mg/dL, HDL 50 mg/dL,  mg/dL. She had blood work done with the CBC results indicate favorable findings, with a notable positive assessment of hemoglobin levels and a white blood cell 6800 per microliter.     The following portions of the patient's history were reviewed and updated as appropriate: allergies, current medications, past family history, past medical history, past social history, past surgical history, and problem list.              Objective:       /74 (BP Location: Left arm, Patient Position: Sitting, Cuff Size: Standard)   Pulse 75   Temp (!) 97.2 °F (36.2 °C) (Temporal)   Ht 5' 4" (1.626 m)   Wt 77.1 kg (170 lb)   SpO2 98%   BMI 29.18 kg/m²          Physical Exam  Vitals and nursing note reviewed. Constitutional:       General: He is not in acute distress. Appearance: Normal appearance. He is well-developed. HENT:      Head: Normocephalic and atraumatic. Eyes:      General:         Right eye: No discharge. Left eye: No discharge. Neck:      Thyroid: No thyromegaly. Cardiovascular:      Rate and Rhythm: Normal rate and regular rhythm. Pulses: Normal pulses. Heart sounds: Normal heart sounds. No murmur heard. Pulmonary:      Effort: Pulmonary effort is normal.      Breath sounds: Normal breath sounds. No wheezing or rhonchi. Musculoskeletal:      Cervical back: Neck supple. Right lower leg: No edema. Left lower leg: No edema. Lymphadenopathy:      Cervical: No cervical adenopathy. Skin:     General: Skin is warm. Capillary Refill: Capillary refill takes less than 2 seconds. Neurological:      General: No focal deficit present. Mental Status: He is alert and oriented to person, place, and time. Psychiatric:         Mood and Affect: Mood normal.         Behavior: Behavior normal.         Thought Content: Thought content normal.          I personally reviewed the recent (and prior)  lab results, the image studies, pathology, other specialty/physicians consult notes and recommendations, and outside medical records from other institutions, as appropriate. I had a lengthy discussion with the patient and shared the work-up findings. We discussed the diagnosis and management plan.   I spent  40  minutes reviewing the records (labs, clinician notes, outside records, medical history, ordering medicine/tests/procedures, interpreting the imaging/labs previously done) and coordination of care as well as direct time with the patient today, of which greater than 50% of the time was spent in counseling and coordination of care with the patient/family. Transcribed for Kristi Russell DO, by Marci Walsh on 12/09/23 at 10:40 PM. Powered by Tom Umanzor. Answers submitted by the patient for this visit:  Medicare Annual Wellness Visit (Submitted on 12/7/2023)  How would you rate your overall health?: very good  Compared to last year, how is your physical health?: same  In general, how satisfied are you with your life?: very satisfied  Compared to last year, how is your eyesight?: same  Compared to last year, how is your hearing?: same  Compared to last year, how is your emotional/mental health?: same  How often is anger a problem for you?: never, rarely  How often do you feel unusually tired/fatigued?: never, rarely  In the past 7 days, how much pain have you experienced?: none  In the past 6 months, have you lost or gained 10 pounds without trying?: No  One or more falls in the last year: No  In the past 6 months, have you accidentally leaked urine?: No  Do you have trouble with the stairs inside or outside your home?: No  Does your home have working smoke alarms?: Yes  Does your home have a carbon monoxide monitor?: No  Which safety hazards (if any) have you experienced in your home? Please select all that apply.: none  How would you describe your current diet?  Please select all that apply.: Regular  In addition to prescription medications, are you taking any over-the-counter supplements?: Yes  If yes, what supplements are you taking?: Benadryl  Can you manage your medications?: Yes  Are you currently taking any opioid medications?: No  Can you walk and transfer into and out of your bed and chair?: Yes  Can you dress and groom yourself?: Yes  Can you bathe or shower yourself?: Yes  Can you feed yourself?: Yes  Can you do your laundry/ housekeeping?: Yes  Can you manage your money, pay your bills, and track your expenses?: Yes  Can you make your own meals?: Yes  Can you do your own shopping?: Yes  Please list your DME (Durable Medical Equipment) supplier, if you use one.: None  Within the last 12 months, have you had any hospitalizations or Emergency Department visits?: No  Do you have a living will?: Yes  Do you have a Durable POA ([place]) for healthcare decisions?: Yes  Do you have an Advanced Directive for end of life decisions?: Yes  How often have you used an illegal drug (including marijuana) or a prescription medication for non-medical reasons in the past year?: never  What is the typical number of drinks you consume in a day?: 0  What is the typical number of drinks you consume in a week?: 0  How often did you have a drink containing alcohol in the past year?: monthly or less  How many drinks did you have on a typical day  when you were drinking in the past year?: 1 to 2  How often did you have 6 or more drinks on one occasion in the past year?: never

## 2023-12-07 NOTE — PATIENT INSTRUCTIONS
Medicare Preventive Visit Patient Instructions  Thank you for completing your Welcome to Medicare Visit or Medicare Annual Wellness Visit today. Your next wellness visit will be due in one year (12/7/2024). The screening/preventive services that you may require over the next 5-10 years are detailed below. Some tests may not apply to you based off risk factors and/or age. Screening tests ordered at today's visit but not completed yet may show as past due. Also, please note that scanned in results may not display below. Preventive Screenings:  Service Recommendations Previous Testing/Comments   Colorectal Cancer Screening  * Colonoscopy    * Fecal Occult Blood Test (FOBT)/Fecal Immunochemical Test (FIT)  * Fecal DNA/Cologuard Test  * Flexible Sigmoidoscopy Age: 43-73 years old   Colonoscopy: every 10 years (may be performed more frequently if at higher risk)  OR  FOBT/FIT: every 1 year  OR  Cologuard: every 3 years  OR  Sigmoidoscopy: every 5 years  Screening may be recommended earlier than age 39 if at higher risk for colorectal cancer. Also, an individualized decision between you and your healthcare provider will decide whether screening between the ages of 77-80 would be appropriate. Colonoscopy: Not on file  FOBT/FIT: Not on file  Cologuard: Not on file  Sigmoidoscopy: Not on file          Breast Cancer Screening Age: 36 years old  Frequency: every 1-2 years  Not required if history of left and right mastectomy Mammogram: Not on file        Cervical Cancer Screening Between the ages of 21-29, pap smear recommended once every 3 years. Between the ages of 32-69, can perform pap smear with HPV co-testing every 5 years.    Recommendations may differ for women with a history of total hysterectomy, cervical cancer, or abnormal pap smears in past. Pap Smear: Not on file    Screening Not Indicated   Hepatitis C Screening Once for adults born between [address] [place] [address]  More frequently in patients at high risk for Hepatitis C Hep C Antibody: 07/29/2021    Screening Current   Diabetes Screening 1-2 times per year if you're at risk for diabetes or have pre-diabetes Fasting glucose: 98 mg/dL (11/27/2023)  A1C: 5.6 % (1/9/2023)  Screening Current   Cholesterol Screening Once every 5 years if you don't have a lipid disorder. May order more often based on risk factors. Lipid panel: 11/27/2023    Screening Not Indicated  History Lipid Disorder     Other Preventive Screenings Covered by Medicare:  Abdominal Aortic Aneurysm (AAA) Screening: covered once if your at risk. You're considered to be at risk if you have a family history of AAA. Lung Cancer Screening: covers low dose CT scan once per year if you meet all of the following conditions: (1) Age 48-67; (2) No signs or symptoms of lung cancer; (3) Current smoker or have quit smoking within the last 15 years; (4) You have a tobacco smoking history of at least 20 pack years (packs per day multiplied by number of years you smoked); (5) You get a written order from a healthcare provider. Glaucoma Screening: covered annually if you're considered high risk: (1) You have diabetes OR (2) Family history of glaucoma OR (3)  aged 48 and older OR (3)  American aged 72 and older  Osteoporosis Screening: covered every 2 years if you meet one of the following conditions: (1) You're estrogen deficient and at risk for osteoporosis based off medical history and other findings; (2) Have a vertebral abnormality; (3) On glucocorticoid therapy for more than 3 months; (4) Have primary hyperparathyroidism; (5) On osteoporosis medications and need to assess response to drug therapy. Last bone density test (DXA Scan): Not on file. HIV Screening: covered annually if you're between the age of 14-79. Also covered annually if you are younger than 13 and older than 72 with risk factors for HIV infection.  For pregnant patients, it is covered up to 3 times per pregnancy. Immunizations:  Immunization Recommendations   Influenza Vaccine Annual influenza vaccination during flu season is recommended for all persons aged >= 6 months who do not have contraindications   Pneumococcal Vaccine   * Pneumococcal conjugate vaccine = PCV13 (Prevnar 13), PCV15 (Vaxneuvance), PCV20 (Prevnar 20)  * Pneumococcal polysaccharide vaccine = PPSV23 (Pneumovax) Adults 88-01 yo with certain risk factors or if 69+ yo  If never received any pneumonia vaccine: recommend Prevnar 20 (PCV20)  Give PCV20 if previously received 1 dose of PCV13 or PPSV23   Hepatitis B Vaccine 3 dose series if at intermediate or high risk (ex: diabetes, end stage renal disease, liver disease)   Respiratory syncytial virus (RSV) Vaccine - COVERED BY MEDICARE PART D  * RSVPreF3 (Arexvy) CDC recommends that adults 61years of age and older may receive a single dose of RSV vaccine using shared clinical decision-making (SCDM)   Tetanus (Td) Vaccine - COST NOT COVERED BY MEDICARE PART B Following completion of primary series, a booster dose should be given every 10 years to maintain immunity against tetanus. Td may also be given as tetanus wound prophylaxis. Tdap Vaccine - COST NOT COVERED BY MEDICARE PART B Recommended at least once for all adults. For pregnant patients, recommended with each pregnancy. Shingles Vaccine (Shingrix) - COST NOT COVERED BY MEDICARE PART B  2 shot series recommended in those 19 years and older who have or will have weakened immune systems or those 50 years and older     Health Maintenance Due:      Topic Date Due   • Breast Cancer Screening: Mammogram  Never done   • Colorectal Cancer Screening  Never done   • Hepatitis C Screening  Completed     Immunizations Due:      Topic Date Due   • Pneumococcal Vaccine: 65+ Years (1 - PCV) Never done   • COVID-19 Vaccine (4 - Pfizer series) 01/15/2022   • Influenza Vaccine (1) 09/01/2023     Advance Directives   What are advance directives?   Advance directives are legal documents that state your wishes and plans for medical care. These plans are made ahead of time in case you lose your ability to make decisions for yourself. Advance directives can apply to any medical decision, such as the treatments you want, and if you want to donate organs. What are the types of advance directives? There are many types of advance directives, and each state has rules about how to use them. You may choose a combination of any of the following:  Living will: This is a written record of the treatment you want. You can also choose which treatments you do not want, which to limit, and which to stop at a certain time. This includes surgery, medicine, IV fluid, and tube feedings. Durable power of  for [facility]): This is a written record that states who you want to make healthcare choices for you when you are unable to make them for yourself. This person, called a proxy, is usually a family member or a friend. You may choose more than 1 proxy. Do not resuscitate (DNR) order:  A DNR order is used in case your heart stops beating or you stop breathing. It is a request not to have certain forms of treatment, such as CPR. A DNR order may be included in other types of advance directives. Medical directive: This covers the care that you want if you are in a coma, near death, or unable to make decisions for yourself. You can list the treatments you want for each condition. Treatment may include pain medicine, surgery, blood transfusions, dialysis, IV or tube feedings, and a ventilator (breathing machine). Values history: This document has questions about your views, beliefs, and how you feel and think about life. This information can help others choose the care that you would choose. Why are advance directives important? An advance directive helps you control your care. Although spoken wishes may be used, it is better to have your wishes written down.  Spoken wishes can be misunderstood, or not followed. Treatments may be given even if you do not want them. An advance directive may make it easier for your family to make difficult choices about your care. Weight Management   Why it is important to manage your weight:  Being overweight increases your risk of health conditions such as heart disease, high blood pressure, type 2 diabetes, and certain types of cancer. It can also increase your risk for osteoarthritis, sleep apnea, and other respiratory problems. Aim for a slow, steady weight loss. Even a small amount of weight loss can lower your risk of health problems. How to lose weight safely:  A safe and healthy way to lose weight is to eat fewer calories and get regular exercise. You can lose up about 1 pound a week by decreasing the number of calories you eat by 500 calories each day. Healthy meal plan for weight management:  A healthy meal plan includes a variety of foods, contains fewer calories, and helps you stay healthy. A healthy meal plan includes the following:  Eat whole-grain foods more often. A healthy meal plan should contain fiber. Fiber is the part of grains, fruits, and vegetables that is not broken down by your body. Whole-grain foods are healthy and provide extra fiber in your diet. Some examples of whole-grain foods are whole-wheat breads and pastas, oatmeal, brown rice, and bulgur. Eat a variety of vegetables every day. Include dark, leafy greens such as spinach, kale, sara greens, and mustard greens. Eat yellow and orange vegetables such as carrots, sweet potatoes, and winter squash. Eat a variety of fruits every day. Choose fresh or canned fruit (canned in its own juice or light syrup) instead of juice. Fruit juice has very little or no fiber. Eat low-fat dairy foods. Drink fat-free (skim) milk or 1% milk. Eat fat-free yogurt and low-fat cottage cheese. Try low-fat cheeses such as mozzarella and other reduced-fat cheeses.   Choose meat and other protein foods that are low in fat. Choose beans or other legumes such as split peas or lentils. Choose fish, skinless poultry (chicken or turkey), or lean cuts of red meat (beef or pork). Before you cook meat or poultry, cut off any visible fat. Use less fat and oil. Try baking foods instead of frying them. Add less fat, such as margarine, sour cream, regular salad dressing and mayonnaise to foods. Eat fewer high-fat foods. Some examples of high-fat foods include french fries, doughnuts, ice cream, and cakes. Eat fewer sweets. Limit foods and drinks that are high in sugar. This includes candy, cookies, regular soda, and sweetened drinks. Exercise:  Exercise at least 30 minutes per day on most days of the week. Some examples of exercise include walking, biking, dancing, and swimming. You can also fit in more physical activity by taking the stairs instead of the elevator or parking farther away from stores. Ask your healthcare provider about the best exercise plan for you. © Copyright [employer] 2018 Information is for End User's use only and may not be sold, redistributed or otherwise used for commercial purposes.  All illustrations and images included in CareNotes® are the copyrighted property of [employer]., Inc. or  [employer]

## 2023-12-07 NOTE — ASSESSMENT & PLAN NOTE
Lipid profile of 11/27/2023 indicates cholesterol 196 mg/dL, triglycerides 211 mg/dL, HDL 50 mg/dL, and  mg/dL. Fully explained and discussed. Currently Pravachol 20 mg, we will increase it up to 40 mg.

## 2023-12-07 NOTE — PROGRESS NOTES
Assessment and Plan:     Problem List Items Addressed This Visit        Respiratory    Chronic rhinitis     Lifelong problem issue. She uses Atrovent 0.06 % spray as needed and fairly rarely but wants a refill today. Relevant Medications    ipratropium (ATROVENT) 0.06 % nasal spray       Other    Mixed hyperlipidemia     Lipid profile of 11/27/2023 indicates cholesterol 196 mg/dL, triglycerides 211 mg/dL, HDL 50 mg/dL, and  mg/dL. Fully explained and discussed. Currently Pravachol 20 mg, we will increase it up to 40 mg.         Relevant Medications    pravastatin (PRAVACHOL) 40 mg tablet    Encounter for immunization - Primary     Senior flu vaccine given today. Medicare wellness done today. Relevant Orders    influenza vaccine, high-dose, PF 0.7 mL (FLUZONE HIGH-DOSE) (Completed)   Other Visit Diagnoses     Medicare annual wellness visit, subsequent            BMI Counseling: Body mass index is 29.18 kg/m². The BMI is above normal. Nutrition recommendations include decreasing portion sizes, encouraging healthy choices of fruits and vegetables, decreasing fast food intake, consuming healthier snacks, limiting drinks that contain sugar, moderation in carbohydrate intake, increasing intake of lean protein and reducing intake of saturated and trans fat. Exercise recommendations include moderate physical activity 150 minutes/week and exercising 3-5 times per week. No pharmacotherapy was ordered. Rationale for BMI follow-up plan is due to patient being overweight or obese. Depression Screening and Follow-up Plan: Patient was screened for depression during today's encounter. They screened negative with a PHQ-2 score of 0. Preventive health issues were discussed with patient, and age appropriate screening tests were ordered as noted in patient's After Visit Summary.   Personalized health advice and appropriate referrals for health education or preventive services given if needed, as noted in patient's After Visit Summary. History of Present Illness:     Patient presents for a Medicare Wellness Visit    HPI   Patient Care Team:  Laureen Goldberg, DO as PCP - General (Family Medicine)     Review of Systems:     Review of Systems     Problem List:     Patient Active Problem List   Diagnosis   • Abnormal EKG   • BMI 32.0-32.9,adult   • Ex-smoker   • Frequent PVCs   • Mixed hyperlipidemia   • Neuropathy   • Paresthesia   • Hyperesthesia   • Encounter for immunization   • Chronic rhinitis      Past Medical and Surgical History:     Past Medical History:   Diagnosis Date   • Arthritis 2020   • GERD (gastroesophageal reflux disease)    • History of echocardiogram 04/06/2018     Left ventricular function is well preserved with an estimated ejection fraction of 60-65%. There is mild left ventricular hypertrophy. The left atrium is top normal in size. Left ventricular diastolic dysfunction is suggested by Doppler analysis.       Past Surgical History:   Procedure Laterality Date   • BUNIONECTOMY     • EYE SURGERY  2015 2018   • FOOT SURGERY Bilateral    • SINUS SURGERY      in her 29's      Family History:     Family History   Problem Relation Age of Onset   • Heart disease Father    • Valvular heart disease Father         replacement in his 63's   • Heart attack Father    • Arthritis Mother    • Dementia Mother    • Dementia Sister       Social History:     Social History     Socioeconomic History   • Marital status:      Spouse name: None   • Number of children: 2   • Years of education: None   • Highest education level: None   Occupational History   • None   Tobacco Use   • Smoking status: Former     Packs/day: 0.50     Years: 20.00     Total pack years: 10.00     Types: Cigarettes   • Smokeless tobacco: Never   • Tobacco comments:     Quit in January 2011   Substance and Sexual Activity   • Alcohol use: Yes     Comment: Perhaps 1 drink per month on average   • Drug use: Never   • Sexual activity: Not Currently     Partners: Male     Birth control/protection: I.U.D., Post-menopausal     Comment: None of your business   Other Topics Concern   • None   Social History Narrative   • None     Social Determinants of Health     Financial Resource Strain: Unknown (12/7/2023)    Overall Financial Resource Strain (CARDIA)    • Difficulty of Paying Living Expenses: Patient refused   Food Insecurity: Not on file   Transportation Needs: Unknown (12/7/2023)    [facility] - Transportation    • Lack of Transportation (Medical): Patient refused    • Lack of Transportation (Non-Medical): Patient refused   Physical Activity: Not on file   Stress: Not on file   Social Connections: Not on file   Intimate Partner Violence: Not on file   Housing Stability: Not on file      Medications and Allergies:     Current Outpatient Medications   Medication Sig Dispense Refill   • famotidine (PEPCID) 20 mg tablet Take 1 tablet by mouth twice daily 180 tablet 3   • ipratropium (ATROVENT) 0.06 % nasal spray 2 sprays into each nostril 4 (four) times a day 15 mL 3   • pravastatin (PRAVACHOL) 40 mg tablet Take 1 tablet (40 mg total) by mouth daily at bedtime 90 tablet 3   • triamcinolone (KENALOG) 0.1 % cream APPLY CREAM EXTERNALLY TWICE DAILY TO ECZEMA UP TO 2 WEEKS MONTH AS NEEDED       No current facility-administered medications for this visit.      No Known Allergies   Immunizations:     Immunization History   Administered Date(s) Administered   • COVID-19 PFIZER VACCINE 0.3 ML IM 02/17/2021, 03/08/2021, 11/20/2021   • Influenza, high dose seasonal 0.7 mL 09/22/2020, 12/13/2021, 12/06/2022, 12/07/2023      Health Maintenance:         Topic Date Due   • Breast Cancer Screening: Mammogram  Never done   • Colorectal Cancer Screening  Never done   • Hepatitis C Screening  Completed         Topic Date Due   • Pneumococcal Vaccine: 65+ Years (1 - PCV) Never done   • COVID-19 Vaccine (4 - Pfizer series) 01/15/2022      Medicare Screening Tests and Risk Assessments:     Pily Dowling is here for her Subsequent Wellness visit. Last Medicare Wellness visit information reviewed, patient interviewed and updates made to the record today. Health Risk Assessment:   Patient rates overall health as very good. Patient feels that their physical health rating is same. Patient is very satisfied with their life. Eyesight was rated as same. Hearing was rated as same. Patient feels that their emotional and mental health rating is same. Patients states they are never, rarely angry. Patient states they are never, rarely unusually tired/fatigued. Pain experienced in the last 7 days has been none. Patient states that she has experienced no weight loss or gain in last 6 months. Depression Screening:   PHQ-2 Score: 0      Fall Risk Screening: In the past year, patient has experienced: no history of falling in past year      Urinary Incontinence Screening:   Patient has not leaked urine accidently in the last six months. Home Safety:  Patient does not have trouble with stairs inside or outside of their home. Patient has working smoke alarms and has no working carbon monoxide detector. Home safety hazards include: none. Nutrition:   Current diet is Regular, Other (please comment) and Limited junk food. Pt has regular protein intake, as well as vegetables and limited fruits     Medications:   Patient is currently taking over-the-counter supplements. OTC medications include: see medication list. Patient is able to manage medications. Activities of Daily Living (ADLs)/Instrumental Activities of Daily Living (IADLs):   Walk and transfer into and out of bed and chair?: Yes  Dress and groom yourself?: Yes    Bathe or shower yourself?: Yes    Feed yourself?  Yes  Do your laundry/housekeeping?: Yes  Manage your money, pay your bills and track your expenses?: Yes  Make your own meals?: Yes    Do your own shopping?: Yes    Durable Medical Equipment Suppliers  None    Previous Hospitalizations:   Any hospitalizations or ED visits within the last 12 months?: No      Advance Care Planning:   Living will: Yes    Durable POA for healthcare: Yes    Advanced directive: Yes    Advanced directive counseling given: Yes    ACP document given: Yes    Patient declined ACP directive: No    End of Life Decisions reviewed with patient: Yes    Provider agrees with end of life decisions: Yes      Cognitive Screening:   Provider or family/friend/caregiver concerned regarding cognition?: No    PREVENTIVE SCREENINGS      Cardiovascular Screening:    General: Screening Not Indicated, History Lipid Disorder and Risks and Benefits Discussed      Diabetes Screening:     General: Screening Current and Risks and Benefits Discussed      Colorectal Cancer Screening:     General: Risks and Benefits Discussed and Patient Declines      Breast Cancer Screening:     General: Risks and Benefits Discussed and Patient Declines      Cervical Cancer Screening:    General: Risks and Benefits Discussed and Screening Current      Osteoporosis Screening:    General: Risks and Benefits Discussed      Abdominal Aortic Aneurysm (AAA) Screening:        General: Risks and Benefits Discussed      Lung Cancer Screening:     General: Screening Not Indicated      Hepatitis C Screening:    General: Screening Current      Preventive Screening Comments: Never had colonoscopy. And refuses - as well as Cologuard! Last mammo in   Smoked but gave it up yrs ago:      Screening, Brief Intervention, and Referral to Treatment (SBIRT)    Screening  Typical number of drinks in a day: 0  Typical number of drinks in a week: 0  Interpretation: Low risk drinking behavior. AUDIT-C Screenin) How often did you have a drink containing alcohol in the past year? monthly or less  2) How many drinks did you have on a typical day when you were drinking in the past year?  1 to 2  3) How often did you have 6 or more drinks on one occasion in the past year? never    AUDIT-C Score: 1  Interpretation: Score 0-2 (female): Negative screen for alcohol misuse    Single Item Drug Screening:  How often have you used an illegal drug (including marijuana) or a prescription medication for non-medical reasons in the past year? never    Single Item Drug Screen Score: 0  Interpretation: Negative screen for possible drug use disorder    Brief Intervention  Alcohol & drug use screenings were reviewed. No concerns regarding substance use disorder identified. Healthy alcohol use/limits discussed. Other Counseling Topics:   Car/seat belt/driving safety, skin self-exam, sunscreen and calcium and vitamin D intake and regular weightbearing exercise. No results found.      Physical Exam:     /74 (BP Location: Left arm, Patient Position: Sitting, Cuff Size: Standard)   Pulse 75   Temp (!) 97.2 °F (36.2 °C) (Temporal)   Ht 5' 4" (1.626 m)   Wt 77.1 kg (170 lb)   SpO2 98%   BMI 29.18 kg/m²     Physical Exam     Abhishek Desir,

## 2023-12-07 NOTE — ASSESSMENT & PLAN NOTE
Lifelong problem issue. She uses Atrovent 0.06 % spray as needed and fairly rarely but wants a refill today.

## 2024-07-15 DIAGNOSIS — K21.9 CHRONIC GERD: ICD-10-CM

## 2024-07-15 RX ORDER — FAMOTIDINE 20 MG/1
20 TABLET, FILM COATED ORAL 2 TIMES DAILY
Qty: 180 TABLET | Refills: 0 | Status: SHIPPED | OUTPATIENT
Start: 2024-07-15

## 2024-10-15 ENCOUNTER — TELEPHONE (OUTPATIENT)
Age: 74
End: 2024-10-15

## 2024-10-15 DIAGNOSIS — R53.83 FATIGUE, UNSPECIFIED TYPE: ICD-10-CM

## 2024-10-15 DIAGNOSIS — E78.2 MIXED HYPERLIPIDEMIA: ICD-10-CM

## 2024-10-15 DIAGNOSIS — Z00.8 EXAM FOR POPULATION SURVEY: ICD-10-CM

## 2024-10-15 DIAGNOSIS — R73.9 ELEVATED BLOOD SUGAR: ICD-10-CM

## 2024-10-15 DIAGNOSIS — Z13.89 SCREENING FOR BLOOD OR PROTEIN IN URINE: ICD-10-CM

## 2024-10-15 DIAGNOSIS — E55.9 VITAMIN D INSUFFICIENCY: Primary | ICD-10-CM

## 2024-10-15 NOTE — TELEPHONE ENCOUNTER
Lab orders placed. Message left on patients answering machine that orders are in chart and can be completed at her convenience.

## 2024-10-15 NOTE — TELEPHONE ENCOUNTER
Patient called in to schedule next AWV is scheduled for 1/6. Patient asked if labs can be put in prior to appointment.    Please advise, thank you

## 2024-10-31 DIAGNOSIS — K21.9 CHRONIC GERD: ICD-10-CM

## 2024-10-31 RX ORDER — FAMOTIDINE 20 MG/1
20 TABLET, FILM COATED ORAL 2 TIMES DAILY
Qty: 180 TABLET | Refills: 1 | Status: SHIPPED | OUTPATIENT
Start: 2024-10-31

## 2024-12-17 ENCOUNTER — APPOINTMENT (OUTPATIENT)
Dept: LAB | Facility: AMBULARY SURGERY CENTER | Age: 74
End: 2024-12-17
Payer: MEDICARE

## 2024-12-17 DIAGNOSIS — E55.9 VITAMIN D INSUFFICIENCY: ICD-10-CM

## 2024-12-17 DIAGNOSIS — R73.9 ELEVATED BLOOD SUGAR: ICD-10-CM

## 2024-12-17 DIAGNOSIS — Z00.8 EXAM FOR POPULATION SURVEY: ICD-10-CM

## 2024-12-17 DIAGNOSIS — R53.83 FATIGUE, UNSPECIFIED TYPE: ICD-10-CM

## 2024-12-17 DIAGNOSIS — E78.2 MIXED HYPERLIPIDEMIA: ICD-10-CM

## 2024-12-17 DIAGNOSIS — Z13.89 SCREENING FOR BLOOD OR PROTEIN IN URINE: ICD-10-CM

## 2024-12-17 LAB
25(OH)D3 SERPL-MCNC: 68.3 NG/ML (ref 30–100)
ALBUMIN SERPL BCG-MCNC: 4.2 G/DL (ref 3.5–5)
ALP SERPL-CCNC: 67 U/L (ref 34–104)
ALT SERPL W P-5'-P-CCNC: 45 U/L (ref 7–52)
ANION GAP SERPL CALCULATED.3IONS-SCNC: 8 MMOL/L (ref 4–13)
AST SERPL W P-5'-P-CCNC: 33 U/L (ref 13–39)
BASOPHILS # BLD AUTO: 0.05 THOUSANDS/ÂΜL (ref 0–0.1)
BASOPHILS NFR BLD AUTO: 1 % (ref 0–1)
BILIRUB SERPL-MCNC: 0.49 MG/DL (ref 0.2–1)
BUN SERPL-MCNC: 12 MG/DL (ref 5–25)
CALCIUM SERPL-MCNC: 9.9 MG/DL (ref 8.4–10.2)
CHLORIDE SERPL-SCNC: 103 MMOL/L (ref 96–108)
CHOLEST SERPL-MCNC: 182 MG/DL (ref ?–200)
CO2 SERPL-SCNC: 29 MMOL/L (ref 21–32)
CREAT SERPL-MCNC: 0.81 MG/DL (ref 0.6–1.3)
EOSINOPHIL # BLD AUTO: 0.33 THOUSAND/ÂΜL (ref 0–0.61)
EOSINOPHIL NFR BLD AUTO: 5 % (ref 0–6)
ERYTHROCYTE [DISTWIDTH] IN BLOOD BY AUTOMATED COUNT: 13.2 % (ref 11.6–15.1)
EST. AVERAGE GLUCOSE BLD GHB EST-MCNC: 117 MG/DL
GFR SERPL CREATININE-BSD FRML MDRD: 71 ML/MIN/1.73SQ M
GLUCOSE P FAST SERPL-MCNC: 102 MG/DL (ref 65–99)
HBA1C MFR BLD: 5.7 %
HCT VFR BLD AUTO: 41.3 % (ref 34.8–46.1)
HDLC SERPL-MCNC: 52 MG/DL
HGB BLD-MCNC: 13.4 G/DL (ref 11.5–15.4)
IMM GRANULOCYTES # BLD AUTO: 0.04 THOUSAND/UL (ref 0–0.2)
IMM GRANULOCYTES NFR BLD AUTO: 1 % (ref 0–2)
LDLC SERPL CALC-MCNC: 75 MG/DL (ref 0–100)
LYMPHOCYTES # BLD AUTO: 2.34 THOUSANDS/ÂΜL (ref 0.6–4.47)
LYMPHOCYTES NFR BLD AUTO: 35 % (ref 14–44)
MCH RBC QN AUTO: 30.4 PG (ref 26.8–34.3)
MCHC RBC AUTO-ENTMCNC: 32.4 G/DL (ref 31.4–37.4)
MCV RBC AUTO: 94 FL (ref 82–98)
MONOCYTES # BLD AUTO: 0.49 THOUSAND/ÂΜL (ref 0.17–1.22)
MONOCYTES NFR BLD AUTO: 7 % (ref 4–12)
NEUTROPHILS # BLD AUTO: 3.47 THOUSANDS/ÂΜL (ref 1.85–7.62)
NEUTS SEG NFR BLD AUTO: 51 % (ref 43–75)
NRBC BLD AUTO-RTO: 0 /100 WBCS
PLATELET # BLD AUTO: 224 THOUSANDS/UL (ref 149–390)
PMV BLD AUTO: 10 FL (ref 8.9–12.7)
POTASSIUM SERPL-SCNC: 4 MMOL/L (ref 3.5–5.3)
PROT SERPL-MCNC: 6.5 G/DL (ref 6.4–8.4)
RBC # BLD AUTO: 4.41 MILLION/UL (ref 3.81–5.12)
SODIUM SERPL-SCNC: 140 MMOL/L (ref 135–147)
TRIGL SERPL-MCNC: 274 MG/DL (ref ?–150)
TSH SERPL DL<=0.05 MIU/L-ACNC: 2.94 UIU/ML (ref 0.45–4.5)
WBC # BLD AUTO: 6.72 THOUSAND/UL (ref 4.31–10.16)

## 2024-12-17 PROCEDURE — 36415 COLL VENOUS BLD VENIPUNCTURE: CPT

## 2024-12-17 PROCEDURE — 84443 ASSAY THYROID STIM HORMONE: CPT

## 2024-12-17 PROCEDURE — 80053 COMPREHEN METABOLIC PANEL: CPT

## 2024-12-17 PROCEDURE — 85025 COMPLETE CBC W/AUTO DIFF WBC: CPT

## 2024-12-17 PROCEDURE — 80061 LIPID PANEL: CPT

## 2024-12-17 PROCEDURE — 82306 VITAMIN D 25 HYDROXY: CPT

## 2024-12-17 PROCEDURE — 83036 HEMOGLOBIN GLYCOSYLATED A1C: CPT

## 2024-12-18 ENCOUNTER — RESULTS FOLLOW-UP (OUTPATIENT)
Dept: FAMILY MEDICINE CLINIC | Facility: CLINIC | Age: 74
End: 2024-12-18

## 2025-01-03 DIAGNOSIS — E78.2 MIXED HYPERLIPIDEMIA: ICD-10-CM

## 2025-01-04 RX ORDER — PRAVASTATIN SODIUM 40 MG
40 TABLET ORAL
Qty: 90 TABLET | Refills: 0 | Status: SHIPPED | OUTPATIENT
Start: 2025-01-04 | End: 2025-01-06 | Stop reason: SDUPTHER

## 2025-01-06 ENCOUNTER — OFFICE VISIT (OUTPATIENT)
Dept: FAMILY MEDICINE CLINIC | Facility: CLINIC | Age: 75
End: 2025-01-06
Payer: MEDICARE

## 2025-01-06 VITALS
DIASTOLIC BLOOD PRESSURE: 82 MMHG | SYSTOLIC BLOOD PRESSURE: 128 MMHG | OXYGEN SATURATION: 97 % | HEART RATE: 72 BPM | TEMPERATURE: 98.3 F | BODY MASS INDEX: 28.58 KG/M2 | HEIGHT: 64 IN | WEIGHT: 167.4 LBS

## 2025-01-06 DIAGNOSIS — E73.9 LACTOSE INTOLERANCE DUE TO ACQUIRED LACTASE DEFICIENCY: ICD-10-CM

## 2025-01-06 DIAGNOSIS — K21.9 CHRONIC GERD: Primary | ICD-10-CM

## 2025-01-06 DIAGNOSIS — Z00.00 MEDICARE ANNUAL WELLNESS VISIT, SUBSEQUENT: ICD-10-CM

## 2025-01-06 DIAGNOSIS — E78.2 MIXED HYPERLIPIDEMIA: ICD-10-CM

## 2025-01-06 DIAGNOSIS — Z23 ENCOUNTER FOR IMMUNIZATION: ICD-10-CM

## 2025-01-06 DIAGNOSIS — I49.3 FREQUENT PVCS: ICD-10-CM

## 2025-01-06 PROCEDURE — G0439 PPPS, SUBSEQ VISIT: HCPCS | Performed by: FAMILY MEDICINE

## 2025-01-06 PROCEDURE — 99214 OFFICE O/P EST MOD 30 MIN: CPT | Performed by: FAMILY MEDICINE

## 2025-01-06 PROCEDURE — 90662 IIV NO PRSV INCREASED AG IM: CPT | Performed by: FAMILY MEDICINE

## 2025-01-06 PROCEDURE — G0008 ADMIN INFLUENZA VIRUS VAC: HCPCS | Performed by: FAMILY MEDICINE

## 2025-01-06 RX ORDER — PRAVASTATIN SODIUM 40 MG
40 TABLET ORAL
Qty: 100 TABLET | Refills: 3 | Status: SHIPPED | OUTPATIENT
Start: 2025-01-06

## 2025-01-06 NOTE — PROGRESS NOTES
"Name: Melina Jay      : 1950      MRN: [id number]  Encounter Provider: VINCENT Jay DO  Encounter Date: 2025   Encounter department: [facility] PRIMARY CARE [place]    Assessment & Plan  Chronic GERD  Severe problems- even last night had \"back of throat\" sour taste. Long talk and discussion of the meds , Sheikh's esoph, etc.   She is moderating her diet, avoid overeating, having BBB, avoids FFF, eating half, etc.  I recommend Ba swallow and EGD  Orders:    FL barium swallow ROUTINE esophagus; Future    Ambulatory referral to Gastroenterology; Future    Mixed hyperlipidemia  Cont on the Pravachol 40 mg OD since 2015. (That was when had cataract surg and PVC's noted - referred to cardiac.)  Orders:    pravastatin (PRAVACHOL) 40 mg tablet; Take 1 tablet (40 mg total) by mouth daily at bedtime    Frequent PVCs  Was seeing [facility] 5 yrs  ago.  Told she had no longer had PVCs and she has not complained of them are noticed them or felt dizzy woozy lightheaded etc.  At the time, was felt to be related to Sudafed?       Medicare annual wellness visit, subsequent  Able to do today       Lactose intolerance due to acquired lactase deficiency         Encounter for immunization    Orders:    influenza vaccine, high-dose, PF 0.5 mL (Fluzone High Dose)       Preventive health issues were discussed with patient, and age appropriate screening tests were ordered as noted in patient's After Visit Summary. Personalized health advice and appropriate referrals for health education or preventive services given if needed, as noted in patient's After Visit Summary.    History of Present Illness     HPI   Patient Care Team:  VINCENT Jay DO as PCP - General (Family Medicine)    Review of Systems  Medical History Reviewed by provider this encounter:  Tobacco  Allergies  Meds  Problems  Med Hx  Surg Hx  Fam Hx       Annual Wellness Visit Questionnaire   Melina is here for her Subsequent Wellness " visit. Last Medicare Wellness visit information reviewed, patient interviewed and updates made to the record today.      Health Risk Assessment:   Patient rates overall health as very good. Patient feels that their physical health rating is same. Patient is satisfied with their life. Eyesight was rated as same. Hearing was rated as same. Patient feels that their emotional and mental health rating is same. Patients states they are never, rarely angry. Patient states they are never, rarely unusually tired/fatigued. Pain experienced in the last 7 days has been none. Patient states that she has experienced no weight loss or gain in last 6 months.     Depression Screening:   PHQ-2 Score: 0      Fall Risk Screening:   In the past year, patient has experienced: no history of falling in past year      Urinary Incontinence Screening:   Patient has not leaked urine accidently in the last six months.     Home Safety:  Patient does not have trouble with stairs inside or outside of their home. Patient has working smoke alarms and has working carbon monoxide detector. Home safety hazards include: none.     Nutrition:   Current diet is Regular, Other (please comment), Frequent junk food, Low Saturated Fat and Low Cholesterol. Pt has regular diet -- good amount of protein, fruits, and vegetables. Avoids greasy/fatty foods due to GI issues -- taking tums regularly     Medications:   Patient is currently taking over-the-counter supplements. OTC medications include: see medication list. Patient is able to manage medications.     Activities of Daily Living (ADLs)/Instrumental Activities of Daily Living (IADLs):   Walk and transfer into and out of bed and chair?: Yes  Dress and groom yourself?: Yes    Bathe or shower yourself?: Yes    Feed yourself? Yes  Do your laundry/housekeeping?: Yes  Manage your money, pay your bills and track your expenses?: Yes  Make your own meals?: Yes    Do your own shopping?: Yes    Previous  Hospitalizations:   Any hospitalizations or ED visits within the last 12 months?: No      Advance Care Planning:   Living will: Yes    Durable POA for healthcare: Yes    Advanced directive: Yes    Advanced directive counseling given: Yes    ACP document given: Yes    Patient declined ACP directive: No    End of Life Decisions reviewed with patient: Yes    Provider agrees with end of life decisions: Yes      Cognitive Screening:   Provider or family/friend/caregiver concerned regarding cognition?: No    PREVENTIVE SCREENINGS      Cardiovascular Screening:    General: Screening Not Indicated, History Lipid Disorder, Risks and Benefits Discussed and Screening Current      Diabetes Screening:     General: Screening Current and Risks and Benefits Discussed      Colorectal Cancer Screening:     General: Risks and Benefits Discussed      Breast Cancer Screening:     General: Risks and Benefits Discussed      Cervical Cancer Screening:    General: Screening Not Indicated and Risks and Benefits Discussed      Lung Cancer Screening:     General: Screening Not Indicated and Risks and Benefits Discussed      Hepatitis C Screening:    General: Screening Current and Risks and Benefits Discussed    Hep C Screening Accepted: Yes      Screening, Brief Intervention, and Referral to Treatment (SBIRT)    Screening  Typical number of drinks in a day: 0  Typical number of drinks in a week: 0  Interpretation: Low risk drinking behavior.    Single Item Drug Screening:  How often have you used an illegal drug (including marijuana) or a prescription medication for non-medical reasons in the past year? never    Single Item Drug Screen Score: 0  Interpretation: Negative screen for possible drug use disorder    Brief Intervention  Alcohol & drug use screenings were reviewed. No concerns regarding substance use disorder identified. Healthy alcohol use/limits discussed.     Other Counseling Topics:   Car/seat belt/driving safety, skin self-exam,  "sunscreen and calcium and vitamin D intake and regular weightbearing exercise. Patient refuses mammogram and colonoscopy despite all my best efforts    Social Drivers of Health     Financial Resource Strain: Patient Declined (12/7/2023)    Overall Financial Resource Strain (CARDIA)     Difficulty of Paying Living Expenses: Patient declined   Food Insecurity: No Food Insecurity (1/6/2025)    Hunger Vital Sign     Worried About Running Out of Food in the Last Year: Never true     Ran Out of Food in the Last Year: Never true   Transportation Needs: No Transportation Needs (1/6/2025)    PRAPARE - Transportation     Lack of Transportation (Medical): No     Lack of Transportation (Non-Medical): No   Housing Stability: Low Risk  (1/6/2025)    Housing Stability Vital Sign     Unable to Pay for Housing in the Last Year: No     Number of Times Moved in the Last Year: 0     Homeless in the Last Year: No   Utilities: Not At Risk (1/6/2025)    [facility] Utilities     Threatened with loss of utilities: No     No results found.    Objective   /82 (BP Location: Left arm, Patient Position: Sitting, Cuff Size: Standard)   Pulse 72   Temp 98.3 °F (36.8 °C) (Temporal)   Ht 5' 4\" (1.626 m)   Wt 75.9 kg (167 lb 6.4 oz)   SpO2 97%   BMI 28.73 kg/m²     Physical Exam  Administrative Statements   I have spent a total time of 40 minutes in caring for this patient on the day of the visit/encounter including Diagnostic results, Prognosis, Risks and benefits of tx options, Instructions for management, Patient and family education, Importance of tx compliance, Risk factor reductions, Impressions, Counseling / Coordination of care, Documenting in the medical record, Reviewing / ordering tests, medicine, procedures  , and Obtaining or reviewing history  .   "

## 2025-01-06 NOTE — PROGRESS NOTES
Name: Melina Jay      : 1950      MRN: [id number]  Encounter Provider: VINCENT Jay DO  Encounter Date: 2025   Encounter department: [facility]    NOTE:  Pls see the above note which is to compliment this note, so as to avoid duplication and repetition.    Assessment & Plan  Chronic GERD  This is the major problem for her visit today and recently she had stomach secretions in the back of her throat causing her to awaken and vomit.  This been going on for a very long time--many years but she has never called it to my attention will follow-up with barium swallow to evaluate for hiatal hernia etc. and EGD to rule out Sheikh's etc. I spent considerable time discussing this she was not inclined to want to do anything.  She absolutely refuses colonoscopy even though I tried to convince her to do both at the same time etc. etc.  Orders:    FL barium swallow ROUTINE esophagus; Future    Ambulatory referral to Gastroenterology; Future    Mixed hyperlipidemia  2024 lipid panel reveals cholesterol 182, triglycerides 274, HDL 52, LDL 75 this is a good response taking Pravachol 40 mg once daily she does not wish to change  Orders:    pravastatin (PRAVACHOL) 40 mg tablet; Take 1 tablet (40 mg total) by mouth daily at bedtime    Frequent PVCs  No longer a problem       Medicare annual wellness visit, subsequent  Was able to complete at end of this OV       Lactose intolerance due to acquired lactase deficiency  Returned from [place] and ate a significant amt of pasta, etc and then explosive, uncontrolled diarrhea while in the airport at [place]. Very embarrassing.        Encounter for immunization  All immunizations reviewed and she is not interested in getting any further vaccinations other than the COVID and flu the latter given today last COVID was May 2024.  I have given her a list of what she needs--RSV, zoster, Prevnar 20, Tdap that she can have at the pharmacy--she is not  "inclined or receptive to my suggestions however  Orders:    influenza vaccine, high-dose, PF 0.5 mL (Fluzone High Dose)      Assessment & Plan  1. Gastroesophageal Reflux Disease (GERD) - Symptoms suggest a possible hiatal hernia or lactose intolerance.  - Order a barium swallow test to assess the esophagus  - Schedule an esophagogastroduodenoscopy (EGD)  - Advise to resume omeprazole 40 mg daily  - Further treatment options will be considered based on test results    2. Premature Ventricular Contractions (PVCs) - No longer exhibits PVCs and has no related symptoms.  - No further action required at this time    3. Hyperlipidemia - Cholesterol levels are well-managed.  - Continue Pravachol 40 mg daily    4. Sinus Congestion - Advised to avoid medications containing pseudoephedrine.  - Use Afrin in severe cases  - Continue using ipratropium nasal spray  - Take antihistamines like Claritin, Allegra, or Zyrtec, but avoid their \"D\" formulations    5. Health Maintenance - Advised to receive several vaccines and undergo colorectal cancer screening.  - Receive shingles vaccine, Tdap, RSV, and Prevnar 20 vaccines  - Undergo colorectal cancer screening through colonoscopy or Cologuard  - Declined a mammogram    PROCEDURE  The patient underwent cataract surgery in 2015.       History of Present Illness     History of Present Illness  The patient is a 74-year-old female who presents for a Medicare wellness visit, GERD, influenza vaccination, and blood pressure management.    GERD  - Experiencing abdominal discomfort attributed to GERD  - Reports episodes of vomiting at night due to reflux reaching her throat  - Attempted to alleviate symptoms by consuming milk without significant relief  - Self-medicating with over-the-counter omeprazole, has three bottles remaining  - Made dietary modifications, including reducing intake of acidic foods such as tomatoes and orange juice  - Reports decreased appetite, often feeling full after " consuming only two slices of pizza  - Overeating results in vomiting a few hours later  - Recalls a severe episode of diarrhea following a meal of macaroni and cheese during a flight from [place], has since avoided macaroni and cheese  - Does not believe she has irritable bowel syndrome (IBS) as she does not experience associated pain  - Still has her gallbladder  - Never undergone an esophagogastroduodenoscopy (EGD)  - Currently on famotidine, administered twice daily, and wishes to discuss gastrointestinal symptoms    Medication and Vaccination History  - On Pravachol 40 mg since 2015, coinciding with her cataract surgery  - Expressed interest in receiving the shingles vaccine    Smoking and Alcohol History  - History of smoking, consuming three-quarters of a pack per day for 20 years before quitting  - Had hepatitis  - Does not consume alcohol due to adverse reactions such as bloating from beer, sinus issues from red wine, and stomach upset from other alcoholic beverages  - Occasionally consumes vodka tonic    Breast Health  - Never had a mammogram and does not perform self-breast exams  - Has a living will in place    Cardiac History  - History of frequent heartbeats, which have since resolved  - Previously under the care of a cardiologist, informed no longer exhibits premature ventricular contractions (PVCs)  - Not experienced any symptoms such as dizziness, wooziness, or lightheadedness    Cold and Sinus Issues  - Using Sudafed D intermittently, typically around Dharmesh when experiencing colds and sinus issues  - Managing symptoms with nightly doses of Benadryl, reducing need for other medications    Supplemental information: She has never undergone an esophagogastroduodenoscopy (EGD).    SOCIAL HISTORY  The patient smoked for 20 years, from age 40 to 60, up to three-quarters of a pack a day. She does not drink much alcohol.    FAMILY HISTORY  The patient mentions that her family members typically live to  "around 83 years old. Her mother had a gallbladder condition.    MEDICATIONS  Famotidine, pravastatin, ipratropium nasal spray, triamcinolone cream, Benadryl.    IMMUNIZATIONS  The patient received a COVID-19 vaccine in May 2024. She is due for an influenza vaccine today.     Review of Systems   Constitutional:  Negative for chills and fever.   HENT:  Negative for ear pain and sore throat.    Eyes:  Negative for pain and visual disturbance.   Respiratory:  Negative for cough and shortness of breath.    Cardiovascular:  Negative for chest pain and palpitations.   Gastrointestinal:  Negative for abdominal pain and vomiting.   Genitourinary:  Negative for dysuria and hematuria.   Musculoskeletal:  Negative for arthralgias and back pain.   Skin:  Negative for color change and rash.   Neurological:  Negative for seizures and syncope.   All other systems reviewed and are negative.    Objective   /82 (BP Location: Left arm, Patient Position: Sitting, Cuff Size: Standard)   Pulse 72   Temp 98.3 °F (36.8 °C) (Temporal)   Ht 5' 4\" (1.626 m)   Wt 75.9 kg (167 lb 6.4 oz)   SpO2 97%   BMI 28.73 kg/m²     Physical Exam    Physical Exam  Vitals and nursing note reviewed.   Constitutional:       General: She is not in acute distress.     Appearance: Normal appearance. She is not ill-appearing, toxic-appearing or diaphoretic.   HENT:      Head: Normocephalic.      Right Ear: There is no impacted cerumen.      Left Ear: There is no impacted cerumen.      Nose: Nose normal.      Mouth/Throat:      Mouth: Mucous membranes are moist.      Pharynx: No oropharyngeal exudate or posterior oropharyngeal erythema.   Eyes:      Extraocular Movements: Extraocular movements intact.      Pupils: Pupils are equal, round, and reactive to light.   Cardiovascular:      Rate and Rhythm: Normal rate and regular rhythm.      Pulses: Normal pulses.      Heart sounds: Normal heart sounds.      No gallop.   Pulmonary:      Effort: No respiratory " distress.      Breath sounds: Normal breath sounds. No wheezing, rhonchi or rales.   Abdominal:      General: Abdomen is flat.   Musculoskeletal:         General: No tenderness.      Cervical back: Normal range of motion and neck supple.      Right lower leg: No edema.      Left lower leg: No edema.   Skin:     General: Skin is warm.      Findings: No lesion or rash.   Neurological:      General: No focal deficit present.      Mental Status: She is alert and oriented to person, place, and time. Mental status is at baseline.   Psychiatric:         Mood and Affect: Mood normal.         Behavior: Behavior normal.         Thought Content: Thought content normal.         Judgment: Judgment normal.       Administrative Statements   I have spent a total time of 40 minutes in caring for this patient on the day of the visit/encounter including Diagnostic results, Prognosis, Risks and benefits of tx options, Instructions for management, Patient and family education, Importance of tx compliance, Risk factor reductions, Impressions, Counseling / Coordination of care, Documenting in the medical record, Reviewing / ordering tests, medicine, procedures  , and Obtaining or reviewing history  .

## 2025-01-07 ENCOUNTER — PREP FOR PROCEDURE (OUTPATIENT)
Age: 75
End: 2025-01-07

## 2025-01-07 ENCOUNTER — TELEPHONE (OUTPATIENT)
Age: 75
End: 2025-01-07

## 2025-01-07 DIAGNOSIS — K21.9 GASTROESOPHAGEAL REFLUX DISEASE, UNSPECIFIED WHETHER ESOPHAGITIS PRESENT: Primary | ICD-10-CM

## 2025-01-07 NOTE — TELEPHONE ENCOUNTER
01/07/25  Screened by: Johanny Guido MA    Referring Provider     Pre- Screening:     There is no height or weight on file to calculate BMI. 28.73  Has patient been referred for a routine screening Colonoscopy? yes  Is the patient between 45-75 years old? yes      Previous Colonoscopy    If yes:    Date:     Facility:     Reason:       Does the patient want to see a Gastroenterologist prior to their procedure OR are they having any GI symptoms? no    Has the patient been hospitalized or had abdominal surgery in the past 6 months? no    Does the patient use supplemental oxygen? no    Does the patient take Coumadin, Lovenox, Plavix, Elliquis, Xarelto, or other blood thinning medication? no    Has the patient had a stroke, cardiac event, or stent placed in the past year? no      If patient is between 45yrs - 49yrs, please advise patient that we will have to confirm benefits & coverage with their insurance company for a routine screening colonoscopy.

## 2025-01-07 NOTE — TELEPHONE ENCOUNTER
OA Questions for EGD  Date: 1/7/25  Screened by: Johanny Guido     Referring Provider:      Pre-Screening: BMI 28.73  Past EGD? If yes - No  Date:   Physician/Facility:   Reason:      SCHEDULING STAFF: If the patient is over 75 years old, please schedule an office visit.  ·      Does the patient want to see a gastroenterologist prior to their procedure to discuss any GI symptoms? NO  ·      Has the patient been hospitalized or had abdominal surgery in the past 6 months? NO  ·      Does the patient use supplemental oxygen? NO   ·      Does the patient take [Coumadin], [Lovenox], [Plavix], [Eliquis], [Xarelto], or other blood thinning medication? NO  ·      Has the patient had a stroke, cardiac event, or stent placed in the past year? NO      SCHEDULING STAFF: If patient answers NO to the above questions, then schedule the procedure. If patient answers YES to any of the above questions, then schedule an office appointment.  ·       If a repeat EGD is belated and patient declines procedure à notify provider.

## 2025-01-07 NOTE — LETTER
January 7, 2025     Attached are your instructions for your upcoming procedure on. If you have any questions, please give us a call at [phone].    Thank you,   [facility]

## 2025-01-16 ENCOUNTER — HOSPITAL ENCOUNTER (OUTPATIENT)
Dept: RADIOLOGY | Facility: HOSPITAL | Age: 75
Discharge: HOME | End: 2025-01-16
Attending: FAMILY MEDICINE
Payer: MEDICARE

## 2025-01-16 DIAGNOSIS — K21.9 CHRONIC GERD: ICD-10-CM

## 2025-01-16 PROCEDURE — 74220 X-RAY XM ESOPHAGUS 1CNTRST: CPT

## 2025-01-19 ENCOUNTER — RESULTS FOLLOW-UP (OUTPATIENT)
Dept: FAMILY MEDICINE CLINIC | Facility: CLINIC | Age: 75
End: 2025-01-19

## 2025-02-10 ENCOUNTER — ANESTHESIA (OUTPATIENT)
Dept: ANESTHESIOLOGY | Facility: HOSPITAL | Age: 75
End: 2025-02-10

## 2025-02-10 ENCOUNTER — ANESTHESIA EVENT (OUTPATIENT)
Dept: ANESTHESIOLOGY | Facility: HOSPITAL | Age: 75
End: 2025-02-10

## 2025-02-12 ENCOUNTER — CONSULT (OUTPATIENT)
Dept: GASTROENTEROLOGY | Facility: CLINIC | Age: 75
End: 2025-02-12
Payer: MEDICARE

## 2025-02-12 VITALS
SYSTOLIC BLOOD PRESSURE: 124 MMHG | TEMPERATURE: 98.4 F | DIASTOLIC BLOOD PRESSURE: 60 MMHG | BODY MASS INDEX: 29.26 KG/M2 | WEIGHT: 171.4 LBS | HEIGHT: 64 IN

## 2025-02-12 DIAGNOSIS — K21.9 GASTROESOPHAGEAL REFLUX DISEASE, UNSPECIFIED WHETHER ESOPHAGITIS PRESENT: Primary | ICD-10-CM

## 2025-02-12 DIAGNOSIS — K21.9 CHRONIC GERD: ICD-10-CM

## 2025-02-12 DIAGNOSIS — R93.3 ABNORMAL ESOPHAGRAM: ICD-10-CM

## 2025-02-12 PROCEDURE — 99203 OFFICE O/P NEW LOW 30 MIN: CPT | Performed by: PHYSICIAN ASSISTANT

## 2025-02-12 NOTE — PROGRESS NOTES
"Name: Melina Jay      : 1950      MRN: [id number]  Encounter Provider: Ida Marquez PA-C  Encounter Date: 2025   Encounter department: [facility] [place]  :  Assessment & Plan  Gastroesophageal reflux disease, unspecified whether esophagitis present  The patient says she has had issues with acid reflux for several years from when she was very young however this has worsened over the last few months.  She says that after she came back home from vacation she started to notice that she was getting the acid reflux and the burping coming up into her mouth and this is causing random episodes of nausea and vomiting.  She was on omeprazole for several years and was switched to Pepcid twice daily as her PCP noted that this could affect her vitamin D levels.  However she says that despite being on Pepcid twice daily for several years, she did not feel this ever really helped her acid reflux.  Since she started to have the random episodes of nausea and vomiting with the reflux, her PCP put her back on over-the-counter omeprazole 20 mg twice daily.  She says she has been on that for the last month and is doing very well.  She says she has not had any further episodes of nausea and vomiting since then.  She says she feels intermittent heartburn once in a while since being on the PPI twice daily but this is not occurring as often as he used to.  She had a barium esophagram that noted potential mucosal ulceration at the GE junction.  -EGD ordered to evaluate the findings of the barium swallow and to also rule out Sheikh's esophagus, esophagitis, gastritis, erosions, malignancy  -Continue omeprazole 20 mg twice daily for now  -Anti-GERD diet       Abnormal esophagram  Esophagram noted \"Mucosal ulceration of the distal most esophagus at or slightly above the GE junction. Gastroenterology consultation and endoscopic correlation is recommended to assure this is a benign " "ulceration. \" It also noted Moderate-sized type III hiatal hernia; Spontaneous gastroesophageal reflux to the level of the thoracic inlet.  Orders:    omeprazole (PriLOSEC) 20 mg delayed release capsule; Take 1 capsule (20 mg total) by mouth 2 (two) times a day        History of Present Illness   HPI  Melina Jay is a 74 y.o. female who presents for follow-up. The patient says she has had issues with acid reflux for several years from when she was very young however this has worsened over the last few months.  She says that after she came back home from vacation she started to notice that she was getting the acid reflux and the burping coming up into her mouth and this is causing random episodes of nausea and vomiting.  She was on omeprazole for several years and was switched to Pepcid twice daily as her PCP noted that this could affect her vitamin D levels.  However she says that despite being on Pepcid twice daily for several years, she did not feel this ever really helped her acid reflux.  Since she started to have the random episodes of nausea and vomiting with the reflux, her PCP put her back on over-the-counter omeprazole 20 mg twice daily.  She says she has been on that for the last month and is doing very well.  She says she has not had any further episodes of nausea and vomiting since then.  She says she feels intermittent heartburn once in a while since being on the PPI twice daily but this is not occurring as often as he used to.  She otherwise denies constipation, diarrhea, family history of colon cancer, unintentional weight loss, fevers, chills, night sweats, trouble swallowing or eating, NSAID use, bloody or black stools. Pt is not on blood thinners. Pt denies tobacco and alcohol use.   History obtained from: patient    Review of Systems   Constitutional:  Negative for chills and fever.   HENT:  Negative for ear pain and sore throat.    Eyes:  Negative for pain and visual disturbance. "   Respiratory:  Negative for cough and shortness of breath.    Cardiovascular:  Negative for chest pain and palpitations.   Gastrointestinal:  Positive for nausea and vomiting. Negative for abdominal distention, abdominal pain, anal bleeding, blood in stool, constipation, diarrhea and rectal pain.   Genitourinary:  Negative for dysuria and hematuria.   Musculoskeletal:  Negative for arthralgias and back pain.   Skin:  Negative for color change and rash.   Neurological:  Negative for seizures and syncope.   All other systems reviewed and are negative.    Medical History Reviewed by provider this encounter:     .  Past Medical History   Past Medical History:   Diagnosis Date    Arthritis 2020    Gastric ulcer 1/16/25    GERD (gastroesophageal reflux disease)     Hernia 1/16/25    History of echocardiogram 04/06/2018     Left ventricular function is well preserved with an estimated ejection fraction of 60-65%. There is mild left ventricular hypertrophy. The left atrium is top normal in size. Left ventricular diastolic dysfunction is suggested by Doppler analysis.      Past Surgical History:   Procedure Laterality Date    BUNIONECTOMY      EYE SURGERY  2015 2018    FOOT SURGERY Bilateral     SINUS SURGERY      in her 30's     Family History   Problem Relation Age of Onset    Heart disease Father     Valvular heart disease Father         replacement in his 60's    Heart attack Father     Arthritis Mother     Dementia Mother     Dementia Sister       reports that she has quit smoking. Her smoking use included cigarettes. She has a 10 pack-year smoking history. She has never used smokeless tobacco. She reports current alcohol use. She reports that she does not use drugs.  Current Outpatient Medications on File Prior to Visit   Medication Sig Dispense Refill    famotidine (PEPCID) 20 mg tablet Take 1 tablet by mouth twice daily 180 tablet 1    pravastatin (PRAVACHOL) 40 mg tablet Take 1 tablet (40 mg total) by mouth  daily at bedtime 100 tablet 3    triamcinolone (KENALOG) 0.1 % cream APPLY CREAM EXTERNALLY TWICE DAILY TO ECZEMA UP TO 2 WEEKS MONTH AS NEEDED      ipratropium (ATROVENT) 0.06 % nasal spray 2 sprays into each nostril 4 (four) times a day (Patient not taking: Reported on 2/12/2025) 15 mL 3     No current facility-administered medications on file prior to visit.   No Known Allergies   Current Outpatient Medications on File Prior to Visit   Medication Sig Dispense Refill    famotidine (PEPCID) 20 mg tablet Take 1 tablet by mouth twice daily 180 tablet 1    pravastatin (PRAVACHOL) 40 mg tablet Take 1 tablet (40 mg total) by mouth daily at bedtime 100 tablet 3    triamcinolone (KENALOG) 0.1 % cream APPLY CREAM EXTERNALLY TWICE DAILY TO ECZEMA UP TO 2 WEEKS MONTH AS NEEDED      ipratropium (ATROVENT) 0.06 % nasal spray 2 sprays into each nostril 4 (four) times a day (Patient not taking: Reported on 2/12/2025) 15 mL 3     No current facility-administered medications on file prior to visit.      Social History     Tobacco Use    Smoking status: Former     Current packs/day: 0.50     Average packs/day: 0.5 packs/day for 20.0 years (10.0 ttl pk-yrs)     Types: Cigarettes    Smokeless tobacco: Never    Tobacco comments:     Quit in January 2011   Substance and Sexual Activity    Alcohol use: Yes     Comment: Perhaps 1 drink per month on average    Drug use: Never    Sexual activity: Not Currently     Partners: Male     Birth control/protection: I.U.D., Post-menopausal     Comment: None of your business        Objective   There were no vitals taken for this visit.     Physical Exam  Constitutional:       Appearance: Normal appearance.   Cardiovascular:      Rate and Rhythm: Normal rate and regular rhythm.   Pulmonary:      Breath sounds: Normal breath sounds.   Abdominal:      General: Bowel sounds are normal. There is no distension.      Palpations: There is no mass.      Tenderness: There is no abdominal tenderness. There is  no guarding or rebound.   Neurological:      Mental Status: She is alert.         Administrative Statements   I have spent a total time of 30 minutes in caring for this patient on the day of the visit/encounter including Diagnostic results, Prognosis, Risks and benefits of tx options, Instructions for management, Patient and family education, Importance of tx compliance, Risk factor reductions, Impressions, Counseling / Coordination of care, Documenting in the medical record, Reviewing / ordering tests, medicine, procedures  , Obtaining or reviewing history  , and Communicating with other healthcare professionals .

## 2025-02-21 ENCOUNTER — TELEPHONE (OUTPATIENT)
Dept: GASTROENTEROLOGY | Facility: AMBULARY SURGERY CENTER | Age: 75
End: 2025-02-21

## 2025-02-21 NOTE — TELEPHONE ENCOUNTER
Pt transferred in by PEP. EGD scheduled 2/24/25. States she had general anesthesia for sinus surgery in her 20's and was admitted due to nausea. NKDA in EMR. Advised we typically use propofol; states she has tolerated this well for other procedures. Advised she will see anesthesiologist prior to procedure and can discuss if needed. Pt appreciative.

## 2025-02-24 ENCOUNTER — ANESTHESIA EVENT (OUTPATIENT)
Dept: GASTROENTEROLOGY | Facility: AMBULARY SURGERY CENTER | Age: 75
End: 2025-02-24
Payer: MEDICARE

## 2025-02-24 ENCOUNTER — HOSPITAL ENCOUNTER (OUTPATIENT)
Dept: GASTROENTEROLOGY | Facility: AMBULARY SURGERY CENTER | Age: 75
Setting detail: OUTPATIENT SURGERY
Discharge: HOME | End: 2025-02-24
Attending: INTERNAL MEDICINE
Payer: MEDICARE

## 2025-02-24 VITALS
OXYGEN SATURATION: 95 % | SYSTOLIC BLOOD PRESSURE: 133 MMHG | HEART RATE: 77 BPM | WEIGHT: 169.6 LBS | DIASTOLIC BLOOD PRESSURE: 60 MMHG | HEIGHT: 65 IN | RESPIRATION RATE: 18 BRPM | TEMPERATURE: 96.8 F | BODY MASS INDEX: 28.26 KG/M2

## 2025-02-24 DIAGNOSIS — K21.9 CHRONIC GERD: ICD-10-CM

## 2025-02-24 DIAGNOSIS — R93.3 ABNORMAL ESOPHAGRAM: ICD-10-CM

## 2025-02-24 DIAGNOSIS — K21.9 GASTROESOPHAGEAL REFLUX DISEASE, UNSPECIFIED WHETHER ESOPHAGITIS PRESENT: ICD-10-CM

## 2025-02-24 PROBLEM — Z98.890 PONV (POSTOPERATIVE NAUSEA AND VOMITING): Status: ACTIVE | Noted: 2025-02-24

## 2025-02-24 PROBLEM — R11.2 PONV (POSTOPERATIVE NAUSEA AND VOMITING): Status: ACTIVE | Noted: 2025-02-24

## 2025-02-24 PROCEDURE — 88313 SPECIAL STAINS GROUP 2: CPT | Performed by: PATHOLOGY

## 2025-02-24 PROCEDURE — 88342 IMHCHEM/IMCYTCHM 1ST ANTB: CPT | Performed by: PATHOLOGY

## 2025-02-24 PROCEDURE — 88312 SPECIAL STAINS GROUP 1: CPT | Performed by: PATHOLOGY

## 2025-02-24 PROCEDURE — 88341 IMHCHEM/IMCYTCHM EA ADD ANTB: CPT | Performed by: PATHOLOGY

## 2025-02-24 PROCEDURE — 88305 TISSUE EXAM BY PATHOLOGIST: CPT | Performed by: PATHOLOGY

## 2025-02-24 PROCEDURE — 43239 EGD BIOPSY SINGLE/MULTIPLE: CPT | Performed by: INTERNAL MEDICINE

## 2025-02-24 RX ORDER — OMEPRAZOLE 20 MG/1
40 CAPSULE, DELAYED RELEASE ORAL 2 TIMES DAILY
Qty: 120 CAPSULE | Refills: 2 | Status: SHIPPED | OUTPATIENT
Start: 2025-02-24

## 2025-02-24 RX ORDER — SODIUM CHLORIDE, SODIUM LACTATE, POTASSIUM CHLORIDE, CALCIUM CHLORIDE 600; 310; 30; 20 MG/100ML; MG/100ML; MG/100ML; MG/100ML
INJECTION, SOLUTION INTRAVENOUS CONTINUOUS PRN
Status: DISCONTINUED | OUTPATIENT
Start: 2025-02-24 | End: 2025-02-24

## 2025-02-24 RX ORDER — LIDOCAINE HYDROCHLORIDE 10 MG/ML
INJECTION, SOLUTION EPIDURAL; INFILTRATION; INTRACAUDAL; PERINEURAL AS NEEDED
Status: DISCONTINUED | OUTPATIENT
Start: 2025-02-24 | End: 2025-02-24

## 2025-02-24 RX ORDER — PROPOFOL 10 MG/ML
INJECTION, EMULSION INTRAVENOUS AS NEEDED
Status: DISCONTINUED | OUTPATIENT
Start: 2025-02-24 | End: 2025-02-24

## 2025-02-24 RX ADMIN — SODIUM CHLORIDE, SODIUM LACTATE, POTASSIUM CHLORIDE, AND CALCIUM CHLORIDE: .6; .31; .03; .02 INJECTION, SOLUTION INTRAVENOUS at 11:58

## 2025-02-24 RX ADMIN — LIDOCAINE HYDROCHLORIDE 80 MG: 10 INJECTION, SOLUTION EPIDURAL; INFILTRATION; INTRACAUDAL; PERINEURAL at 12:23

## 2025-02-24 RX ADMIN — PROPOFOL 150 MG: 10 INJECTION, EMULSION INTRAVENOUS at 12:23

## 2025-02-24 RX ADMIN — PROPOFOL 50 MG: 10 INJECTION, EMULSION INTRAVENOUS at 12:26

## 2025-02-24 NOTE — ANESTHESIA POSTPROCEDURE EVALUATION
Post-Op Assessment Note    CV Status:  Stable  Pain Score: 0    Pain management: adequate       Mental Status:  Sleepy   Hydration Status:  Euvolemic   PONV Controlled:  Controlled   Airway Patency:  Patent     Post Op Vitals Reviewed: Yes    No anethesia notable event occurred.    Staff: Anesthesiologist, CRNA           Last Filed PACU Vitals:  Vitals Value Taken Time   Temp     Pulse 77 02/24/25 1231   BP 98/53 02/24/25 1231   Resp 12 02/24/25 1231   SpO2 97 % 02/24/25 1231

## 2025-02-24 NOTE — ANESTHESIA PREPROCEDURE EVALUATION
Procedure:  EGD    Relevant Problems   ANESTHESIA   (+) PONV (postoperative nausea and vomiting)      CARDIO   (+) Frequent PVCs   (+) Mixed hyperlipidemia      ENDO (within normal limits)      GI/HEPATIC (within normal limits)      /RENAL (within normal limits)      GYN (within normal limits)      HEMATOLOGY (within normal limits)      MUSCULOSKELETAL (within normal limits)      PULMONARY (within normal limits)      Ear/Nose/Throat   (+) Chronic rhinitis      Neurology/Sleep   (+) Neuropathy        Physical Exam    Airway    Mallampati score: II  TM Distance: >3 FB  Neck ROM: full     Dental   No notable dental hx     Cardiovascular      Pulmonary      Other Findings  post-pubertal.      Anesthesia Plan  ASA Score- 2     Anesthesia Type- IV sedation with anesthesia with ASA Monitors.         Additional Monitors:     Airway Plan:            Plan Factors-Exercise tolerance (METS): >4 METS.    Chart reviewed.    Patient summary reviewed.    Patient is not a current smoker.              Induction- intravenous.    Postoperative Plan-         Informed Consent- Anesthetic plan and risks discussed with patient.  I personally reviewed this patient with the CRNA. Discussed and agreed on the Anesthesia Plan with the CRNA..      NPO Status:  No vitals data found for the desired time range.

## 2025-02-24 NOTE — INTERVAL H&P NOTE
H&P reviewed. After examining the patient I find no changes in the patients condition since the H&P had been written.    Vitals:    02/24/25 1147   BP: 129/77   Pulse: 75   Resp: 18   Temp: (!) 97.4 °F (36.3 °C)   SpO2: 97%

## 2025-02-26 ENCOUNTER — RESULTS FOLLOW-UP (OUTPATIENT)
Dept: GASTROENTEROLOGY | Facility: CLINIC | Age: 75
End: 2025-02-26

## 2025-02-26 PROCEDURE — 88313 SPECIAL STAINS GROUP 2: CPT | Performed by: PATHOLOGY

## 2025-02-26 PROCEDURE — 88312 SPECIAL STAINS GROUP 1: CPT | Performed by: PATHOLOGY

## 2025-02-26 PROCEDURE — 88341 IMHCHEM/IMCYTCHM EA ADD ANTB: CPT | Performed by: PATHOLOGY

## 2025-02-26 PROCEDURE — 88305 TISSUE EXAM BY PATHOLOGIST: CPT | Performed by: PATHOLOGY

## 2025-02-26 PROCEDURE — 88342 IMHCHEM/IMCYTCHM 1ST ANTB: CPT | Performed by: PATHOLOGY

## 2025-03-20 DIAGNOSIS — J31.0 CHRONIC RHINITIS: ICD-10-CM

## 2025-03-20 RX ORDER — IPRATROPIUM BROMIDE 42 UG/1
2 SPRAY, METERED NASAL 4 TIMES DAILY
Qty: 80 ML | Refills: 1 | Status: SHIPPED | OUTPATIENT
Start: 2025-03-20

## 2025-03-20 NOTE — TELEPHONE ENCOUNTER
Reason for call:   [x] Refill   [] Prior Auth  [] Other:     Office:   [x] PCP/Provider - [facility]  Authorized By: VINCENT Jay DO  [] Specialty/Provider -     Medication: ipratropium (ATROVENT) 0.06 % nasal spray       Pharmacy: [facility] [address] [place] [address] Pharmacy   Does the patient have enough for 3 days?   [] Yes   [x] No - Send as HP to POD    Mail Away Pharmacy   Does the patient have enough for 10 days?   [] Yes   [] No - Send as HP to POD

## 2025-04-11 ENCOUNTER — TELEPHONE (OUTPATIENT)
Dept: ADMINISTRATIVE | Facility: OTHER | Age: 75
End: 2025-04-11

## 2025-04-11 NOTE — TELEPHONE ENCOUNTER
04/11/25 3:17 PM    Patient contacted to bring Advance Directive, POLST, or Living Will document to next scheduled pcp visit.[facility] Department spoke with patient/ caregiver.    Thank you.  Seema Yadav MA  [facility]

## 2025-04-14 ENCOUNTER — OFFICE VISIT (OUTPATIENT)
Dept: FAMILY MEDICINE CLINIC | Facility: CLINIC | Age: 75
End: 2025-04-14
Payer: MEDICARE

## 2025-04-14 VITALS
DIASTOLIC BLOOD PRESSURE: 72 MMHG | HEART RATE: 77 BPM | HEIGHT: 65 IN | BODY MASS INDEX: 29.09 KG/M2 | TEMPERATURE: 98 F | OXYGEN SATURATION: 97 % | WEIGHT: 174.6 LBS | SYSTOLIC BLOOD PRESSURE: 120 MMHG

## 2025-04-14 DIAGNOSIS — Z79.899 MEDICATION MANAGEMENT: Primary | ICD-10-CM

## 2025-04-14 DIAGNOSIS — J31.0 CHRONIC RHINITIS: ICD-10-CM

## 2025-04-14 DIAGNOSIS — Z71.2 ENCOUNTER TO DISCUSS TEST RESULTS: ICD-10-CM

## 2025-04-14 DIAGNOSIS — M19.041 PRIMARY OSTEOARTHRITIS OF BOTH HANDS: ICD-10-CM

## 2025-04-14 DIAGNOSIS — M19.042 PRIMARY OSTEOARTHRITIS OF BOTH HANDS: ICD-10-CM

## 2025-04-14 DIAGNOSIS — M65.312 TRIGGER FINGER OF LEFT THUMB: ICD-10-CM

## 2025-04-14 DIAGNOSIS — E78.2 MIXED HYPERLIPIDEMIA: ICD-10-CM

## 2025-04-14 PROCEDURE — 99214 OFFICE O/P EST MOD 30 MIN: CPT | Performed by: FAMILY MEDICINE

## 2025-04-14 PROCEDURE — G2211 COMPLEX E/M VISIT ADD ON: HCPCS | Performed by: FAMILY MEDICINE

## 2025-04-14 RX ORDER — DIPHENHYDRAMINE HCL 25 MG
25 CAPSULE ORAL
COMMUNITY

## 2025-04-14 NOTE — PROGRESS NOTES
Name: Melina Jay      : 1950      MRN: [id number]  Encounter Provider: VINCENT Jay DO  Encounter Date: 2025   Encounter department: [facility]    :  Assessment & Plan  Medication management  We talked at great length about her various medications and particularly the use of PPIs and their effect on renal function and possible dementia.  She seems to like the omeprazole 20 mg 2 tablets twice daily but I advised that she should not be taking that lifelong.  She is only 74.  No problem taking it for 3 to 6 months but not for ever.  She does not wish to return to famotidine thus she will speak with  and work out a plan for further treatment of her distal esophageal ulcer which was found in late February she is going for EGD 3 months later to make certain that he has and we discussed all of that       Encounter to discuss test results  See comments above regarding EGD and present and appropriate therapy       Chronic rhinitis  She does not really think she has an infection but is not sure I will give her a paper copy of Z-Anuel to take if this gets out of hand and she knows not to take it unless it gets much worse she will let me know.       Mixed hyperlipidemia         Trigger finger of left thumb         Primary osteoarthritis of both hands             Assessment & Plan  1. Sinus issues: Chronic.  - Prescription for Z-Anuel to be used only if condition significantly worsens.  - Continue using ipratropium nose spray.    2. Trigger thumb: Chronic.  - Continue using a brace.  - Take Tylenol Extra Strength 500 mg, 2 tablets 3 times a day, up to a maximum dose of 3000 mg per day.  - Consider seeing a hand surgeon for potential steroid injection or surgery if condition worsens or becomes intolerable.    3. Esophageal ulcer: Acute.  - Continue taking omeprazole 20 mg, 2 tablets twice a day.  - Discuss long-term use of PPIs and their potential impact on renal function and dementia  risk.  - Consult with Dr. Jay to develop a comprehensive treatment plan.    Follow-up  - Follow up in 6 months.    PROCEDURE  The patient underwent an EGD procedure on 02/24/2025.       History of Present Illness     History of Present Illness  The patient is a 74-year-old female who presents for evaluation of sinus issues, trigger thumb, and esophageal ulcer.    Sinus Issues  - Persistent sneezing following an EGD procedure on 02/24/2025, during which she was administered propofol.  - Managing with a twice-daily sinus medication, recently refilled.  - Does not believe it is an infection as she does not have thick or discolored mucus.  - Recalls a previous sinus infection that resulted in a toothache, but the affected tooth has since been extracted.    Trigger Thumb  - Developed approximately 3 weeks ago, causing discomfort.  - Pain severe enough to disrupt sleep when attempting to pull up covers at night.  - Occasional locking of her ring finger.  - Using a brace for support, misplaced the original brace last Saturday.  - Procured a larger brace, secures with tape when pain intensifies later in the day or at night.  - Performing exercises and massaging the area, providing some relief.  - Avoiding ibuprofen due to stomach condition, seeking alternative anti-inflammatory medications.  - Using Tylenol Extra Strength 500 mg as needed for pain relief.    Esophageal Ulcer  - History of an ulcer located at the base of her esophagus.  - Advised to schedule a follow-up appointment after endoscopy but has not received any communication.  - Resumed taking omeprazole, initially at a dose of 20 mg once daily, later increased to 20 mg twice daily.  - Reports feeling better than she has since before her pregnancy.    Supplemental information: She is still on ipratropium nasal spray and pravastatin.    MEDICATIONS  Current: Omeprazole, ipratropium nasal spray, pravastatin, Tylenol Extra Strength.     Review of Systems  "  Constitutional:  Negative for chills and fever.   HENT:  Negative for ear pain and sore throat.    Eyes:  Negative for pain and visual disturbance.   Respiratory:  Negative for cough and shortness of breath.    Cardiovascular:  Negative for chest pain and palpitations.   Gastrointestinal:  Negative for abdominal pain and vomiting.   Genitourinary:  Negative for dysuria and hematuria.   Musculoskeletal:  Negative for arthralgias and back pain.   Skin:  Negative for color change and rash.   Neurological:  Negative for seizures and syncope.   All other systems reviewed and are negative.    Objective   /72 (BP Location: Left arm, Patient Position: Sitting, Cuff Size: Standard)   Pulse 77   Temp 98 °F (36.7 °C) (Temporal)   Ht 5' 5\" (1.651 m)   Wt 79.2 kg (174 lb 9.6 oz)   SpO2 97%   BMI 29.05 kg/m²     Physical Exam  Examination of thumb was performed.  Physical Exam  Constitutional:       General: She is not in acute distress.     Appearance: Normal appearance. She is not ill-appearing, toxic-appearing or diaphoretic.   HENT:      Head: Normocephalic.      Nose: Congestion and rhinorrhea present.   Cardiovascular:      Rate and Rhythm: Normal rate and regular rhythm.   Pulmonary:      Effort: Pulmonary effort is normal.   Musculoskeletal:         General: Tenderness present. No deformity.      Right lower leg: No edema.      Left lower leg: No edema.      Comments: Clicking sensation in the distal interphalangeal joint of the left thumb which is fairly new in the past several weeks and also pain at the metacarpal carpal junction.  Explained the nature of osteoarthritis etc. she just feels bad that she is hit with all of these issues--the OA, rhinitis, now distal esophageal ulcer etc.   Skin:     Findings: No rash.   Neurological:      General: No focal deficit present.      Mental Status: She is alert and oriented to person, place, and time. Mental status is at baseline.   Psychiatric:         Mood and " "Affect: Mood normal.         Behavior: Behavior normal.         Thought Content: Thought content normal.         Judgment: Judgment normal.      Comments: Very very pleasant 74-year-old who is just not entheustic about much of anything t the moment, sort of \"what happens, happens\".  We talked at length about medication about safety of medication effects on kidney and stomach etc. etc.  I explained to her in detail CKD and various medications advising to just take extra strength Tylenol maximum dose 3000 mg/day       Administrative Statements   I have spent a total time of 30 minutes in caring for this patient on the day of the visit/encounter including Diagnostic results, Prognosis, Risks and benefits of tx options, Instructions for management, Patient and family education, Importance of tx compliance, Risk factor reductions, Impressions, Counseling / Coordination of care, Documenting in the medical record, Reviewing/placing orders in the medical record (including tests, medications, and/or procedures), and Obtaining or reviewing history  .  "

## 2025-04-14 NOTE — ASSESSMENT & PLAN NOTE
She does not really think she has an infection but is not sure I will give her a paper copy of Z-Anuel to take if this gets out of hand and she knows not to take it unless it gets much worse she will let me know.

## 2025-04-25 ENCOUNTER — TELEPHONE (OUTPATIENT)
Dept: GASTROENTEROLOGY | Facility: CLINIC | Age: 75
End: 2025-04-25

## 2025-04-25 NOTE — TELEPHONE ENCOUNTER
Scheduled date of EGD(as of today): 6/4/25  Physician performing EGD: Dr Oseguera  Location of EGD: [facility]  Instructions reviewed with patient by: Elizabeth-mailed to home address  Clearances: none

## 2025-05-13 ENCOUNTER — NURSE TRIAGE (OUTPATIENT)
Age: 75
End: 2025-05-13

## 2025-05-13 DIAGNOSIS — J32.9 OTHER SINUSITIS, UNSPECIFIED CHRONICITY: Primary | ICD-10-CM

## 2025-05-13 RX ORDER — DOXYCYCLINE 100 MG/1
100 TABLET ORAL 2 TIMES DAILY
Qty: 20 TABLET | Refills: 0 | Status: SHIPPED | OUTPATIENT
Start: 2025-05-13 | End: 2025-05-23

## 2025-05-21 ENCOUNTER — ANESTHESIA (OUTPATIENT)
Dept: ANESTHESIOLOGY | Facility: HOSPITAL | Age: 75
End: 2025-05-21

## 2025-05-21 ENCOUNTER — ANESTHESIA EVENT (OUTPATIENT)
Dept: ANESTHESIOLOGY | Facility: HOSPITAL | Age: 75
End: 2025-05-21

## 2025-06-04 ENCOUNTER — ANESTHESIA (OUTPATIENT)
Dept: GASTROENTEROLOGY | Facility: AMBULARY SURGERY CENTER | Age: 75
End: 2025-06-04
Payer: MEDICARE

## 2025-06-04 ENCOUNTER — HOSPITAL ENCOUNTER (OUTPATIENT)
Dept: GASTROENTEROLOGY | Facility: AMBULARY SURGERY CENTER | Age: 75
Setting detail: OUTPATIENT SURGERY
Discharge: HOME | End: 2025-06-04
Attending: INTERNAL MEDICINE
Payer: MEDICARE

## 2025-06-04 VITALS
TEMPERATURE: 96.9 F | RESPIRATION RATE: 18 BRPM | BODY MASS INDEX: 30.56 KG/M2 | DIASTOLIC BLOOD PRESSURE: 74 MMHG | SYSTOLIC BLOOD PRESSURE: 109 MMHG | OXYGEN SATURATION: 96 % | WEIGHT: 179 LBS | HEART RATE: 75 BPM | HEIGHT: 64 IN

## 2025-06-04 DIAGNOSIS — K21.00 GASTROESOPHAGEAL REFLUX DISEASE WITH ESOPHAGITIS, UNSPECIFIED WHETHER HEMORRHAGE: ICD-10-CM

## 2025-06-04 PROCEDURE — 43249 ESOPH EGD DILATION <30 MM: CPT | Performed by: INTERNAL MEDICINE

## 2025-06-04 PROCEDURE — 43239 EGD BIOPSY SINGLE/MULTIPLE: CPT | Performed by: INTERNAL MEDICINE

## 2025-06-04 PROCEDURE — 88305 TISSUE EXAM BY PATHOLOGIST: CPT | Performed by: STUDENT IN AN ORGANIZED HEALTH CARE EDUCATION/TRAINING PROGRAM

## 2025-06-04 PROCEDURE — C1726 CATH, BAL DIL, NON-VASCULAR: HCPCS

## 2025-06-04 RX ORDER — PROPOFOL 10 MG/ML
INJECTION, EMULSION INTRAVENOUS AS NEEDED
Status: DISCONTINUED | OUTPATIENT
Start: 2025-06-04 | End: 2025-06-04

## 2025-06-04 RX ORDER — SODIUM CHLORIDE, SODIUM LACTATE, POTASSIUM CHLORIDE, CALCIUM CHLORIDE 600; 310; 30; 20 MG/100ML; MG/100ML; MG/100ML; MG/100ML
INJECTION, SOLUTION INTRAVENOUS CONTINUOUS PRN
Status: DISCONTINUED | OUTPATIENT
Start: 2025-06-04 | End: 2025-06-04

## 2025-06-04 RX ORDER — LIDOCAINE HYDROCHLORIDE 10 MG/ML
INJECTION, SOLUTION EPIDURAL; INFILTRATION; INTRACAUDAL; PERINEURAL AS NEEDED
Status: DISCONTINUED | OUTPATIENT
Start: 2025-06-04 | End: 2025-06-04

## 2025-06-04 RX ADMIN — PROPOFOL 50 MG: 10 INJECTION, EMULSION INTRAVENOUS at 07:41

## 2025-06-04 RX ADMIN — LIDOCAINE HYDROCHLORIDE 50 MG: 10 INJECTION, SOLUTION EPIDURAL; INFILTRATION; INTRACAUDAL at 07:35

## 2025-06-04 RX ADMIN — SODIUM CHLORIDE, SODIUM LACTATE, POTASSIUM CHLORIDE, AND CALCIUM CHLORIDE: .6; .31; .03; .02 INJECTION, SOLUTION INTRAVENOUS at 07:09

## 2025-06-04 RX ADMIN — PROPOFOL 50 MG: 10 INJECTION, EMULSION INTRAVENOUS at 07:46

## 2025-06-04 RX ADMIN — PROPOFOL 20 MG: 10 INJECTION, EMULSION INTRAVENOUS at 07:43

## 2025-06-04 RX ADMIN — PROPOFOL 100 MG: 10 INJECTION, EMULSION INTRAVENOUS at 07:35

## 2025-06-04 RX ADMIN — PROPOFOL 50 MG: 10 INJECTION, EMULSION INTRAVENOUS at 07:37

## 2025-06-04 RX ADMIN — PROPOFOL 50 MG: 10 INJECTION, EMULSION INTRAVENOUS at 07:39

## 2025-06-04 NOTE — ANESTHESIA PREPROCEDURE EVALUATION
Procedure:  EGD    Relevant Problems   ANESTHESIA   (+) PONV (postoperative nausea and vomiting)      CARDIO   (+) Frequent PVCs   (+) Mixed hyperlipidemia      ENDO (within normal limits)      GI/HEPATIC (within normal limits)      /RENAL (within normal limits)      GYN (within normal limits)      HEMATOLOGY (within normal limits)      MUSCULOSKELETAL (within normal limits)      NEURO/PSYCH   (+) Paresthesia      PULMONARY (within normal limits)        Physical Exam    Airway     Mallampati score: II  TM Distance: >3 FB  Neck ROM: full      Cardiovascular      Dental   No notable dental hx     Pulmonary      Neurological      Other Findings  post-pubertal.      Anesthesia Plan  ASA Score- 2     Anesthesia Type- IV sedation with anesthesia with ASA Monitors.         Additional Monitors:     Airway Plan:            Plan Factors-Exercise tolerance (METS): >4 METS.    Chart reviewed.    Patient summary reviewed.    Patient is not a current smoker.              Induction- intravenous.    Postoperative Plan-         Informed Consent- Anesthetic plan and risks discussed with patient.  I personally reviewed this patient with the CRNA. Discussed and agreed on the Anesthesia Plan with the CRNA..      NPO Status:  No vitals data found for the desired time range.

## 2025-06-04 NOTE — ANESTHESIA POSTPROCEDURE EVALUATION
Post-Op Assessment Note    CV Status:  Stable  Pain Score: 0    Pain management: adequate       Mental Status:  Arousable   Hydration Status:  Stable   PONV Controlled:  Controlled   Airway Patency:  Patent  Two or more mitigation strategies used for obstructive sleep apnea   Post Op Vitals Reviewed: Yes    No anethesia notable event occurred.    Staff: CRNA           Last Filed PACU Vitals:  Vitals Value Taken Time   Temp     Pulse 71    BP 89/56    Resp 14    SpO2 93

## 2025-06-04 NOTE — H&P
"History and Physical -  [facility]  Melina Jay 74 y.o. female MRN: [id number]    HPI: Melina Jay is a 74 y.o. year old female who presents for EGD for GERD/Esophagitis      Review of Systems    Historical Information   Past Medical History[1]  Past Surgical History[2]  Social History   Social History     Substance and Sexual Activity   Alcohol Use Yes    Comment: Perhaps 1 drink per month on average     Social History     Substance and Sexual Activity   Drug Use Never     Tobacco Use History[3]  Family History[4]    Meds/Allergies     Not in a hospital admission.    Allergies[5]    Objective     /80   Pulse 80   Temp (!) 97 °F (36.1 °C) (Temporal)   Resp 18   Ht 5' 4\" (1.626 m)   Wt 81.2 kg (179 lb)   SpO2 98%   BMI 30.73 kg/m²       PHYSICAL EXAM    Gen: NAD  CV: RRR  CHEST: Clear  ABD: soft, NT/ND  EXT: no edema  Neuro: AAO      ASSESSMENT/PLAN:  This is a 74 y.o. year old female here for EGD    PLAN:   Procedure: EGD           [1]   Past Medical History:  Diagnosis Date    Arthritis 2020    Gastric ulcer 1/16/25    GERD (gastroesophageal reflux disease)     Hernia 1/16/25    History of echocardiogram 04/06/2018     Left ventricular function is well preserved with an estimated ejection fraction of 60-65%. There is mild left ventricular hypertrophy. The left atrium is top normal in size. Left ventricular diastolic dysfunction is suggested by Doppler analysis.    [2]   Past Surgical History:  Procedure Laterality Date    BUNIONECTOMY      EYE SURGERY  2015 2018    FOOT SURGERY Bilateral     SINUS SURGERY      in her 30's   [3]   Social History  Tobacco Use   Smoking Status Former    Current packs/day: 0.50    Average packs/day: 0.5 packs/day for 20.0 years (10.0 ttl pk-yrs)    Types: Cigarettes   Smokeless Tobacco Never   Tobacco Comments    Quit in January 2011   [4]   Family History  Problem Relation Name Age of Onset    Heart disease Father      Valvular heart disease " Father          replacement in his 60's    Heart attack Father      Arthritis Mother Yanni     Dementia Mother Yanni     Dementia Sister Teresa Lemon    [5] No Known Allergies

## 2025-06-05 PROCEDURE — 88305 TISSUE EXAM BY PATHOLOGIST: CPT | Performed by: STUDENT IN AN ORGANIZED HEALTH CARE EDUCATION/TRAINING PROGRAM

## 2025-06-06 DIAGNOSIS — R93.3 ABNORMAL ESOPHAGRAM: ICD-10-CM

## 2025-06-06 DIAGNOSIS — K21.9 CHRONIC GERD: ICD-10-CM

## 2025-06-06 RX ORDER — OMEPRAZOLE 20 MG/1
20 CAPSULE, DELAYED RELEASE ORAL 2 TIMES DAILY
Qty: 60 CAPSULE | Refills: 2 | Status: SHIPPED | OUTPATIENT
Start: 2025-06-06

## 2025-06-12 ENCOUNTER — RESULTS FOLLOW-UP (OUTPATIENT)
Age: 75
End: 2025-06-12